# Patient Record
Sex: FEMALE | NOT HISPANIC OR LATINO
[De-identification: names, ages, dates, MRNs, and addresses within clinical notes are randomized per-mention and may not be internally consistent; named-entity substitution may affect disease eponyms.]

---

## 2017-05-10 PROBLEM — Z00.00 ENCOUNTER FOR PREVENTIVE HEALTH EXAMINATION: Status: ACTIVE | Noted: 2017-05-10

## 2017-05-12 ENCOUNTER — APPOINTMENT (OUTPATIENT)
Dept: GASTROENTEROLOGY | Facility: CLINIC | Age: 27
End: 2017-05-12

## 2017-05-12 ENCOUNTER — OUTPATIENT (OUTPATIENT)
Dept: OUTPATIENT SERVICES | Facility: HOSPITAL | Age: 27
LOS: 1 days | Discharge: HOME | End: 2017-05-12

## 2017-05-12 DIAGNOSIS — A04.8 OTHER SPECIFIED BACTERIAL INTESTINAL INFECTIONS: ICD-10-CM

## 2017-05-12 RX ORDER — METRONIDAZOLE 500 MG/1
500 TABLET ORAL TWICE DAILY
Qty: 28 | Refills: 0 | Status: COMPLETED | COMMUNITY
Start: 2017-05-12

## 2017-05-12 RX ORDER — METRONIDAZOLE 500 MG/1
500 TABLET ORAL TWICE DAILY
Qty: 28 | Refills: 0 | Status: ACTIVE | COMMUNITY
Start: 2017-05-12 | End: 1900-01-01

## 2017-05-12 RX ORDER — PANTOPRAZOLE 40 MG/1
40 TABLET, DELAYED RELEASE ORAL TWICE DAILY
Qty: 28 | Refills: 3 | Status: ACTIVE | COMMUNITY
Start: 2017-05-12 | End: 1900-01-01

## 2017-05-12 RX ORDER — CLARITHROMYCIN 500 MG/1
500 TABLET, FILM COATED ORAL
Qty: 28 | Refills: 0 | Status: ACTIVE | COMMUNITY
Start: 2017-05-12 | End: 1900-01-01

## 2017-05-12 RX ORDER — PANTOPRAZOLE 40 MG/1
40 TABLET, DELAYED RELEASE ORAL
Qty: 30 | Refills: 0 | Status: ACTIVE | COMMUNITY
Start: 2017-04-10

## 2017-06-05 ENCOUNTER — APPOINTMENT (OUTPATIENT)
Dept: SURGERY | Facility: CLINIC | Age: 27
End: 2017-06-05

## 2017-06-16 ENCOUNTER — APPOINTMENT (OUTPATIENT)
Dept: GASTROENTEROLOGY | Facility: CLINIC | Age: 27
End: 2017-06-16

## 2017-06-28 DIAGNOSIS — R93.8 ABNORMAL FINDINGS ON DIAGNOSTIC IMAGING OF OTHER SPECIFIED BODY STRUCTURES: ICD-10-CM

## 2017-06-28 DIAGNOSIS — A04.8 OTHER SPECIFIED BACTERIAL INTESTINAL INFECTIONS: ICD-10-CM

## 2018-01-08 ENCOUNTER — INPATIENT (INPATIENT)
Facility: HOSPITAL | Age: 28
LOS: 17 days | Discharge: HOME | DRG: 330 | End: 2018-01-26
Attending: SURGERY | Admitting: SURGERY
Payer: SELF-PAY

## 2018-01-08 VITALS
SYSTOLIC BLOOD PRESSURE: 103 MMHG | RESPIRATION RATE: 16 BRPM | TEMPERATURE: 97 F | OXYGEN SATURATION: 100 % | DIASTOLIC BLOOD PRESSURE: 67 MMHG | HEART RATE: 71 BPM

## 2018-01-08 LAB
ALBUMIN SERPL ELPH-MCNC: 3.8 G/DL — SIGNIFICANT CHANGE UP (ref 3.4–5)
ALP SERPL-CCNC: 75 U/L — SIGNIFICANT CHANGE UP (ref 40–120)
ALT FLD-CCNC: 18 U/L — SIGNIFICANT CHANGE UP (ref 12–42)
ANION GAP SERPL CALC-SCNC: 4 MMOL/L — LOW (ref 9–16)
ANISOCYTOSIS BLD QL: SIGNIFICANT CHANGE UP
APPEARANCE UR: CLEAR — SIGNIFICANT CHANGE UP
APTT BLD: 20.7 SEC — LOW (ref 27.5–36.5)
AST SERPL-CCNC: 25 U/L — SIGNIFICANT CHANGE UP (ref 15–37)
BASOPHILS NFR BLD AUTO: 1.3 % — SIGNIFICANT CHANGE UP (ref 0–2)
BILIRUB SERPL-MCNC: 0.2 MG/DL — SIGNIFICANT CHANGE UP (ref 0.2–1.2)
BILIRUB UR-MCNC: NEGATIVE — SIGNIFICANT CHANGE UP
BUN SERPL-MCNC: 10 MG/DL — SIGNIFICANT CHANGE UP (ref 7–23)
CALCIUM SERPL-MCNC: 9.4 MG/DL — SIGNIFICANT CHANGE UP (ref 8.5–10.5)
CHLORIDE SERPL-SCNC: 105 MMOL/L — SIGNIFICANT CHANGE UP (ref 96–108)
CO2 SERPL-SCNC: 29 MMOL/L — SIGNIFICANT CHANGE UP (ref 22–31)
COLOR SPEC: YELLOW — SIGNIFICANT CHANGE UP
CREAT SERPL-MCNC: 0.7 MG/DL — SIGNIFICANT CHANGE UP (ref 0.5–1.3)
DIFF PNL FLD: NEGATIVE — SIGNIFICANT CHANGE UP
EOSINOPHIL NFR BLD AUTO: 2.1 % — SIGNIFICANT CHANGE UP (ref 0–6)
ETHANOL SERPL-MCNC: <3 MG/DL — SIGNIFICANT CHANGE UP
GLUCOSE SERPL-MCNC: 89 MG/DL — SIGNIFICANT CHANGE UP (ref 70–99)
GLUCOSE UR QL: NEGATIVE — SIGNIFICANT CHANGE UP
HCG SERPL-ACNC: <1 MIU/ML — SIGNIFICANT CHANGE UP
HCG UR QL: NEGATIVE — SIGNIFICANT CHANGE UP
HCT VFR BLD CALC: 29.3 % — LOW (ref 34.5–45)
HGB BLD-MCNC: 8.3 G/DL — LOW (ref 11.5–15.5)
HYPOCHROMIA BLD QL: SIGNIFICANT CHANGE UP
IMM GRANULOCYTES NFR BLD AUTO: 0.3 % — SIGNIFICANT CHANGE UP (ref 0–1.5)
INR BLD: 1.01 — SIGNIFICANT CHANGE UP (ref 0.88–1.16)
KETONES UR-MCNC: NEGATIVE — SIGNIFICANT CHANGE UP
LACTATE SERPL-SCNC: 0.5 MMOL/L — SIGNIFICANT CHANGE UP (ref 0.4–2)
LEUKOCYTE ESTERASE UR-ACNC: NEGATIVE — SIGNIFICANT CHANGE UP
LIDOCAIN IGE QN: 254 U/L — SIGNIFICANT CHANGE UP (ref 73–393)
LYMPHOCYTES # BLD AUTO: 33.4 % — SIGNIFICANT CHANGE UP (ref 13–44)
MCHC RBC-ENTMCNC: 19 PG — LOW (ref 27–34)
MCHC RBC-ENTMCNC: 28.3 G/DL — LOW (ref 32–36)
MCV RBC AUTO: 67.2 FL — LOW (ref 80–100)
MICROCYTES BLD QL: SIGNIFICANT CHANGE UP
MONOCYTES NFR BLD AUTO: 4.1 % — SIGNIFICANT CHANGE UP (ref 2–14)
NEUTROPHILS NFR BLD AUTO: 58.8 % — SIGNIFICANT CHANGE UP (ref 43–77)
NITRITE UR-MCNC: NEGATIVE — SIGNIFICANT CHANGE UP
PH UR: 6 — SIGNIFICANT CHANGE UP (ref 5–8)
PLAT MORPH BLD: NORMAL — SIGNIFICANT CHANGE UP
PLATELET # BLD AUTO: 62 K/UL — LOW (ref 150–400)
POTASSIUM SERPL-MCNC: 3.8 MMOL/L — SIGNIFICANT CHANGE UP (ref 3.5–5.3)
POTASSIUM SERPL-SCNC: 3.8 MMOL/L — SIGNIFICANT CHANGE UP (ref 3.5–5.3)
PROT SERPL-MCNC: 8.4 G/DL — HIGH (ref 6.4–8.2)
PROT UR-MCNC: NEGATIVE MG/DL — SIGNIFICANT CHANGE UP
PROTHROM AB SERPL-ACNC: 11.1 SEC — SIGNIFICANT CHANGE UP (ref 9.8–12.7)
RBC # BLD: 4.36 M/UL — SIGNIFICANT CHANGE UP (ref 3.8–5.2)
RBC # FLD: 22.4 % — HIGH (ref 10.3–16.9)
RBC BLD AUTO: (no result)
SODIUM SERPL-SCNC: 138 MMOL/L — SIGNIFICANT CHANGE UP (ref 132–145)
SP GR SPEC: 1.02 — SIGNIFICANT CHANGE UP (ref 1–1.03)
TSH SERPL-MCNC: 1.26 UIU/ML — SIGNIFICANT CHANGE UP (ref 0.36–3.74)
UROBILINOGEN FLD QL: 0.2 E.U./DL — SIGNIFICANT CHANGE UP
WBC # BLD: 7 K/UL — SIGNIFICANT CHANGE UP (ref 3.8–10.5)
WBC # FLD AUTO: 7 K/UL — SIGNIFICANT CHANGE UP (ref 3.8–10.5)

## 2018-01-08 PROCEDURE — 74177 CT ABD & PELVIS W/CONTRAST: CPT | Mod: 26

## 2018-01-08 PROCEDURE — 76830 TRANSVAGINAL US NON-OB: CPT | Mod: 26

## 2018-01-08 PROCEDURE — 99284 EMERGENCY DEPT VISIT MOD MDM: CPT

## 2018-01-08 RX ORDER — HEPARIN SODIUM 5000 [USP'U]/ML
5000 INJECTION INTRAVENOUS; SUBCUTANEOUS EVERY 12 HOURS
Qty: 0 | Refills: 0 | Status: DISCONTINUED | OUTPATIENT
Start: 2018-01-08 | End: 2018-01-16

## 2018-01-08 RX ORDER — MORPHINE SULFATE 50 MG/1
2 CAPSULE, EXTENDED RELEASE ORAL ONCE
Qty: 0 | Refills: 0 | Status: DISCONTINUED | OUTPATIENT
Start: 2018-01-08 | End: 2018-01-08

## 2018-01-08 RX ORDER — ONDANSETRON 8 MG/1
4 TABLET, FILM COATED ORAL EVERY 6 HOURS
Qty: 0 | Refills: 0 | Status: DISCONTINUED | OUTPATIENT
Start: 2018-01-08 | End: 2018-01-26

## 2018-01-08 RX ORDER — ACETAMINOPHEN 500 MG
1000 TABLET ORAL ONCE
Qty: 0 | Refills: 0 | Status: COMPLETED | OUTPATIENT
Start: 2018-01-08 | End: 2018-01-08

## 2018-01-08 RX ORDER — SODIUM CHLORIDE 9 MG/ML
1000 INJECTION, SOLUTION INTRAVENOUS
Qty: 0 | Refills: 0 | Status: DISCONTINUED | OUTPATIENT
Start: 2018-01-08 | End: 2018-01-11

## 2018-01-08 RX ORDER — SODIUM CHLORIDE 9 MG/ML
1000 INJECTION INTRAMUSCULAR; INTRAVENOUS; SUBCUTANEOUS ONCE
Qty: 0 | Refills: 0 | Status: COMPLETED | OUTPATIENT
Start: 2018-01-08 | End: 2018-01-08

## 2018-01-08 RX ORDER — ONDANSETRON 8 MG/1
4 TABLET, FILM COATED ORAL ONCE
Qty: 0 | Refills: 0 | Status: COMPLETED | OUTPATIENT
Start: 2018-01-08 | End: 2018-01-08

## 2018-01-08 RX ORDER — IOHEXOL 300 MG/ML
50 INJECTION, SOLUTION INTRAVENOUS ONCE
Qty: 0 | Refills: 0 | Status: COMPLETED | OUTPATIENT
Start: 2018-01-08 | End: 2018-01-08

## 2018-01-08 RX ADMIN — SODIUM CHLORIDE 2000 MILLILITER(S): 9 INJECTION INTRAMUSCULAR; INTRAVENOUS; SUBCUTANEOUS at 19:19

## 2018-01-08 RX ADMIN — Medication 400 MILLIGRAM(S): at 23:34

## 2018-01-08 RX ADMIN — IOHEXOL 50 MILLILITER(S): 300 INJECTION, SOLUTION INTRAVENOUS at 14:25

## 2018-01-08 RX ADMIN — MORPHINE SULFATE 2 MILLIGRAM(S): 50 CAPSULE, EXTENDED RELEASE ORAL at 14:24

## 2018-01-08 RX ADMIN — SODIUM CHLORIDE 2000 MILLILITER(S): 9 INJECTION INTRAMUSCULAR; INTRAVENOUS; SUBCUTANEOUS at 14:25

## 2018-01-08 RX ADMIN — ONDANSETRON 4 MILLIGRAM(S): 8 TABLET, FILM COATED ORAL at 14:24

## 2018-01-08 NOTE — ED PROVIDER NOTE - OBJECTIVE STATEMENT
27 female pt, hx of intussusception in the past, required surgery and then has had 2 more surgeries for SBO afterwards. Most of the work up in [place], then [place]. Today presents w several complains:    1. abd pain - started 3 days ago, worsening upper and L sided abdomen, bloated sensation, but able to pass gas. nausea but no vomiting, no fever, no chills    2. vag bleeding - pt w hx of anemia, most likely iron deficiency and has had vag bleeding for 3 weeks. Hx of D/C. Irregular periods.

## 2018-01-08 NOTE — ED ADULT TRIAGE NOTE - CHIEF COMPLAINT QUOTE
Patient complaining of abdominal pain after she eats, and complaining of vaginal bleeding x 2weeks. Denies pregnancy

## 2018-01-08 NOTE — ED PROVIDER NOTE - PROGRESS NOTE DETAILS
US w some chronic non specific changes that pt was aware of. CT w findings suggestive of new intussusception w lead point. Discussed case w Dr Victoria for admission.

## 2018-01-08 NOTE — ED ADULT NURSE REASSESSMENT NOTE - NS ED NURSE REASSESS COMMENT FT1
Pt. remains in room awaiting admission and transfer to [facility]. Pt. refusing pain medications at this time, pt. educated on the availability of medication if she changes her mind. Report given awaiting transfer.

## 2018-01-09 ENCOUNTER — FORM ENCOUNTER (OUTPATIENT)
Age: 28
End: 2018-01-09

## 2018-01-09 DIAGNOSIS — Z98.890 OTHER SPECIFIED POSTPROCEDURAL STATES: Chronic | ICD-10-CM

## 2018-01-09 DIAGNOSIS — N93.9 ABNORMAL UTERINE AND VAGINAL BLEEDING, UNSPECIFIED: ICD-10-CM

## 2018-01-09 DIAGNOSIS — K56.50 INTESTINAL ADHESIONS [BANDS], UNSPECIFIED AS TO PARTIAL VERSUS COMPLETE OBSTRUCTION: Chronic | ICD-10-CM

## 2018-01-09 LAB
ACANTHOCYTES BLD QL SMEAR: SLIGHT — SIGNIFICANT CHANGE UP
ANION GAP SERPL CALC-SCNC: 10 MMOL/L — SIGNIFICANT CHANGE UP (ref 5–17)
ANISOCYTOSIS BLD QL: SIGNIFICANT CHANGE UP
BILIRUB DIRECT SERPL-MCNC: <0.2 MG/DL — SIGNIFICANT CHANGE UP (ref 0–0.2)
BUN SERPL-MCNC: 6 MG/DL — LOW (ref 7–23)
BURR CELLS BLD QL SMEAR: SIGNIFICANT CHANGE UP
BURR CELLS BLD QL SMEAR: SIGNIFICANT CHANGE UP
CALCIUM SERPL-MCNC: 8.4 MG/DL — SIGNIFICANT CHANGE UP (ref 8.4–10.5)
CHLORIDE SERPL-SCNC: 105 MMOL/L — SIGNIFICANT CHANGE UP (ref 96–108)
CLOSURE TME COLL+EPINEP BLD: 43 K/UL — LOW (ref 150–400)
CO2 SERPL-SCNC: 22 MMOL/L — SIGNIFICANT CHANGE UP (ref 22–31)
CREAT SERPL-MCNC: 0.65 MG/DL — SIGNIFICANT CHANGE UP (ref 0.5–1.3)
D DIMER BLD IA.RAPID-MCNC: <150 NG/ML DDU — SIGNIFICANT CHANGE UP
DACRYOCYTES BLD QL SMEAR: SLIGHT — SIGNIFICANT CHANGE UP
ELLIPTOCYTES BLD QL SMEAR: SLIGHT — SIGNIFICANT CHANGE UP
FERRITIN SERPL-MCNC: 11.7 NG/ML — LOW (ref 15–150)
GIANT PLATELETS BLD QL SMEAR: PRESENT — SIGNIFICANT CHANGE UP
GLUCOSE SERPL-MCNC: 77 MG/DL — SIGNIFICANT CHANGE UP (ref 70–99)
HAPTOGLOB SERPL-MCNC: 129 MG/DL — SIGNIFICANT CHANGE UP (ref 34–200)
HBV SURFACE AG SER-ACNC: SIGNIFICANT CHANGE UP
HCT VFR BLD CALC: 23.5 % — LOW (ref 34.5–45)
HCT VFR BLD CALC: 26.3 % — LOW (ref 34.5–45)
HCT VFR BLD CALC: 28.6 % — LOW (ref 34.5–45)
HCV AB S/CO SERPL IA: 0.17 S/CO — SIGNIFICANT CHANGE UP
HCV AB SERPL-IMP: SIGNIFICANT CHANGE UP
HGB BLD-MCNC: 6.8 G/DL — CRITICAL LOW (ref 11.5–15.5)
HGB BLD-MCNC: 7.4 G/DL — LOW (ref 11.5–15.5)
HGB BLD-MCNC: 7.8 G/DL — LOW (ref 11.5–15.5)
HIV 1+2 AB+HIV1 P24 AG SERPL QL IA: SIGNIFICANT CHANGE UP
HYPOCHROMIA BLD QL: SIGNIFICANT CHANGE UP
IRON SATN MFR SERPL: 13 % — LOW (ref 14–50)
IRON SATN MFR SERPL: 55 UG/DL — SIGNIFICANT CHANGE UP (ref 30–160)
LDH SERPL L TO P-CCNC: 134 U/L — SIGNIFICANT CHANGE UP (ref 50–242)
LG PLATELETS BLD QL AUTO: PRESENT — SIGNIFICANT CHANGE UP
MAGNESIUM SERPL-MCNC: 1.8 MG/DL — SIGNIFICANT CHANGE UP (ref 1.6–2.6)
MANUAL DIF COMMENT BLD-IMP: SIGNIFICANT CHANGE UP
MANUAL SMEAR VERIFICATION: SIGNIFICANT CHANGE UP
MCHC RBC-ENTMCNC: 18.4 PG — LOW (ref 27–34)
MCHC RBC-ENTMCNC: 18.8 PG — LOW (ref 27–34)
MCHC RBC-ENTMCNC: 19.3 PG — LOW (ref 27–34)
MCHC RBC-ENTMCNC: 27.3 G/DL — LOW (ref 32–36)
MCHC RBC-ENTMCNC: 28.1 G/DL — LOW (ref 32–36)
MCHC RBC-ENTMCNC: 28.9 G/DL — LOW (ref 32–36)
MCV RBC AUTO: 66.8 FL — LOW (ref 80–100)
MCV RBC AUTO: 66.8 FL — LOW (ref 80–100)
MCV RBC AUTO: 67.5 FL — LOW (ref 80–100)
MICROCYTES BLD QL: SIGNIFICANT CHANGE UP
OVALOCYTES BLD QL SMEAR: SLIGHT — SIGNIFICANT CHANGE UP
PHOSPHATE SERPL-MCNC: 3.3 MG/DL — SIGNIFICANT CHANGE UP (ref 2.5–4.5)
PLAT MORPH BLD: (no result)
PLATELET # BLD AUTO: 100 K/UL — LOW (ref 150–400)
PLATELET # BLD AUTO: 63 K/UL — LOW (ref 150–400)
PLATELET # BLD AUTO: 67 K/UL — LOW (ref 150–400)
POIKILOCYTOSIS BLD QL AUTO: SIGNIFICANT CHANGE UP
POLYCHROMASIA BLD QL SMEAR: SLIGHT — SIGNIFICANT CHANGE UP
POTASSIUM SERPL-MCNC: 3.9 MMOL/L — SIGNIFICANT CHANGE UP (ref 3.5–5.3)
POTASSIUM SERPL-SCNC: 3.9 MMOL/L — SIGNIFICANT CHANGE UP (ref 3.5–5.3)
RBC # BLD: 3.52 M/UL — LOW (ref 3.8–5.2)
RBC # BLD: 3.94 M/UL — SIGNIFICANT CHANGE UP (ref 3.8–5.2)
RBC # BLD: 4.24 M/UL — SIGNIFICANT CHANGE UP (ref 3.8–5.2)
RBC # BLD: 4.24 M/UL — SIGNIFICANT CHANGE UP (ref 3.8–5.2)
RBC # FLD: 21.7 % — HIGH (ref 10.3–16.9)
RBC # FLD: 21.8 % — HIGH (ref 10.3–16.9)
RBC # FLD: 22.2 % — HIGH (ref 10.3–16.9)
RBC BLD AUTO: (no result)
RETICS/RBC NFR: 1.2 % — SIGNIFICANT CHANGE UP (ref 0.5–2.5)
SCHISTOCYTES BLD QL AUTO: SIGNIFICANT CHANGE UP
SODIUM SERPL-SCNC: 137 MMOL/L — SIGNIFICANT CHANGE UP (ref 135–145)
SPHEROCYTES BLD QL SMEAR: SLIGHT — SIGNIFICANT CHANGE UP
TIBC SERPL-MCNC: 411 UG/DL — SIGNIFICANT CHANGE UP (ref 220–430)
UIBC SERPL-MCNC: 356 UG/DL — SIGNIFICANT CHANGE UP (ref 110–370)
WBC # BLD: 4.9 K/UL — SIGNIFICANT CHANGE UP (ref 3.8–10.5)
WBC # BLD: 6.2 K/UL — SIGNIFICANT CHANGE UP (ref 3.8–10.5)
WBC # BLD: 6.6 K/UL — SIGNIFICANT CHANGE UP (ref 3.8–10.5)
WBC # FLD AUTO: 4.9 K/UL — SIGNIFICANT CHANGE UP (ref 3.8–10.5)
WBC # FLD AUTO: 6.2 K/UL — SIGNIFICANT CHANGE UP (ref 3.8–10.5)
WBC # FLD AUTO: 6.6 K/UL — SIGNIFICANT CHANGE UP (ref 3.8–10.5)

## 2018-01-09 PROCEDURE — 99222 1ST HOSP IP/OBS MODERATE 55: CPT | Mod: GC

## 2018-01-09 RX ORDER — SOD SULF/SODIUM/NAHCO3/KCL/PEG
4000 SOLUTION, RECONSTITUTED, ORAL ORAL ONCE
Qty: 0 | Refills: 0 | Status: COMPLETED | OUTPATIENT
Start: 2018-01-09 | End: 2018-01-09

## 2018-01-09 RX ORDER — HYDROMORPHONE HYDROCHLORIDE 2 MG/ML
0.5 INJECTION INTRAMUSCULAR; INTRAVENOUS; SUBCUTANEOUS EVERY 6 HOURS
Qty: 0 | Refills: 0 | Status: DISCONTINUED | OUTPATIENT
Start: 2018-01-09 | End: 2018-01-09

## 2018-01-09 RX ORDER — ACETAMINOPHEN 500 MG
700 TABLET ORAL ONCE
Qty: 0 | Refills: 0 | Status: DISCONTINUED | OUTPATIENT
Start: 2018-01-09 | End: 2018-01-12

## 2018-01-09 RX ADMIN — HEPARIN SODIUM 5000 UNIT(S): 5000 INJECTION INTRAVENOUS; SUBCUTANEOUS at 18:03

## 2018-01-09 RX ADMIN — Medication 4000 MILLILITER(S): at 18:03

## 2018-01-09 RX ADMIN — HEPARIN SODIUM 5000 UNIT(S): 5000 INJECTION INTRAVENOUS; SUBCUTANEOUS at 05:53

## 2018-01-09 NOTE — CONSULT NOTE ADULT - SUBJECTIVE AND OBJECTIVE BOX
HPI:  28 YO F h/o intussusception (2008) s/p ex-lap with SBR and appendectomy c/b small bowel obstruction s/p diagnostic laparoscopic with lysis of adhesion in 2016 p/w abdominal pain x 5 days. Abdominal pain is R-sided, worsened with food intake, and associated with generalized weakness and palpitations. Pt denies fever, chills, CP, SOB, melena, hematochezia. Pt states that she previously had a heavy menstrual period that improved with birth control, however, she has not been on birth control for the last year and has been having intermittent vaginal spotting. Over the last 2 weeks, she has been having vaginal bleeding with clots 1-2 episodes per day. Pt had a BM yesterday described as the consistency of shaving cream and is has been passing flatus.      Of note, she last had an EGD and colonoscopy at [facility] which showed numerous polyps that were removed. Per patient pathology was benign.     Allergies  No Known Allergies    Home Medications: None    MEDICATIONS:  MEDICATIONS  (STANDING):  acetaminophen  IVPB. 700 milliGRAM(s) IV Intermittent once  heparin  Injectable 5000 Unit(s) SubCutaneous every 12 hours  lactated ringers. 1000 milliLiter(s) (100 mL/Hr) IV Continuous <Continuous>    MEDICATIONS  (PRN):  ondansetron Injectable 4 milliGRAM(s) IV Push every 6 hours PRN Nausea    PAST MEDICAL & SURGICAL HISTORY:  Intestinal adhesions with obstruction, unspecified whether partial or complete: diagnostic lap with SANDRO  H/O exploratory laparotomy: with SBR and appendectomy    FAMILY HISTORY:  Mom: Oral cancer  Denies significant family history of CRC, gastric cancer, ovarian Ca, endometrial Ca.     SOCIAL HISTORY:  [place]: Denies  Alcohol: Social  Illicit Drugs: Denies    REVIEW OF SYSTEMS: per HPI    Vital Signs Last 24 Hrs  T(C): 37 (09 Jan 2018 12:14), Max: 37 (09 Jan 2018 12:14)  T(F): 98.6 (09 Jan 2018 12:14), Max: 98.6 (09 Jan 2018 12:14)  HR: 69 (09 Jan 2018 12:14) (60 - 82)  BP: 89/56 (09 Jan 2018 12:14) (89/56 - 106/65)  BP(mean): --  RR: 16 (09 Jan 2018 12:14) (16 - 17)  SpO2: 98% (09 Jan 2018 12:14) (98% - 100%)    01-08 @ 07:01  -  01-09 @ 07:00  --------------------------------------------------------  IN: 900 mL / OUT: 0 mL / NET: 900 mL    01-09 @ 07:01 - 01-09 @ 13:26  --------------------------------------------------------  IN: 500 mL / OUT: 0 mL / NET: 500 mL    PHYSICAL EXAM:    General: Frail; in no acute distress  HEENT: MMM, conjunctiva and sclera clear  Gastrointestinal: Soft, RLQ TTP non-distended; No rebound or guarding  Extremities: Normal range of motion, No clubbing, cyanosis or edema  Neurological: Alert and oriented x3  Skin: Warm and dry. No obvious rash    LABS:                        7.4    6.2   )-----------( 67       ( 09 Jan 2018 12:42 )             26.3     01-09    137  |  105  |  6<L>  ----------------------------<  77  3.9   |  22  |  0.65    Ca    8.4      09 Jan 2018 06:38  Phos  3.3     01-09  Mg     1.8     01-09    TPro  8.4<H>  /  Alb  3.8  /  TBili  0.2  /  DBili  x   /  AST  25  /  ALT  18  /  AlkPhos  75  01-08    RADIOLOGY & ADDITIONAL STUDIES:  CT Abdomen and Pelvis w/ Oral Cont and w/ IV Cont (01.08.18 @ 16:48)  FINDINGS:    Lower chest: Clear lung bases. No pericardial effusion. No cardiomegaly.    Liver: Smooth in contour. No focal mass. Portal and hepatic veins are   patent.    Biliary system: Gallbladder is normal in size. No calcified gallstones.   No pericholecystic fluid or gallbladder wall thickening. No biliary   ductal dilatation.    Pancreas: There is a 0.7 cm cystic lesion within the body of the   pancreas.no pancreatic ductal dilatation. The main pancreatic duct drains   via the minor and major papillae.    Spleen: Unremarkable.    Adrenal glands: Unremarkable.    Kidneys: Symmetric parenchymal enhancement. No renal mass. No   hydronephrosis. No renal or ureteralstone.     Urinary Bladder: The bladder is collapsed, otherwise normal in appearance.    Reproductive organs: Evaluation of the uterus demonstrates uterus to be   anteverted with endometrial thickening measuring up to 2.1 cm, which may   represent endometrial hyperplasia or   hematometria.A few cervical   nabothian cysts are again noted, the largest measuring 1.5 cm. The   ovaries appear to be unremarkable. The right ovary measures 1.3 x 2.6 x   2.5 cm. The left ovary measures 3.4 x 1.7 x 2.9 cm.The urinary bladder   is underdistended.    Bowel/Peritoneum: Left lower quadrant enteroenteric intussusception.   There is a 2.3 x 1.8 x 2.1 cm polypoid lesion which projects at the   distal aspect of the intussusception, and likely represents a lead point.   There is no evidence of pneumatosis or free air. No portal venous gas is   identified. The appendix is not visualized on this examination, however   there are no secondary signs of acute appendicitis. No ascites. Dilated   focal loop of ileum in the right lower quadrant appears to be due to    resection with side-to-side anastomosis. 3.1 x 2.9 x 3.1 cm  mass in   relation to the inferior pole of cecum. Suspicion of subtle circular fold   thickening involving the jejunum. The ingested oral contrast material has   transited to the proximal ascending colon thus no surgical obstruction.    Lymph nodes: No lymphadenopathy.    Aorta/IVC: Normal caliber.    Abdominal wall: No hernia.    Bones/Soft tissues: No acute abnormality.  Bilateral spondylolysis of L5   with grade 1 anterolisthesis of L5 on S1.      IMPRESSION:   1.  Small bowel-small bowel (enteroenteric) intussusception in the left   lower quadrant. 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 Differential diagnosis includes adenomatous   polyp, Meckel's diverticulum, leiomyoma, neurofibroma, and hamartomatous   polyp. No surgical obstruction. Ingested  oral contrast material seen in   the proximal ascending colon.   2.  Mass in relation to the inferior pole of cecum. Differential   diagnosis would include cecal polyp, inverted appendix, ileocecal   intussusception, desmoid. A normal appendix is not identified. Status   post prior small bowel resection.    The appearance of the endometrial canal thickening could be related to   endometrial hyperplasia, giant endometrial polyp.

## 2018-01-09 NOTE — PROGRESS NOTE ADULT - SUBJECTIVE AND OBJECTIVE BOX
ON (1/8) : New admission from [facility] for intussusception with no sign of SBO. Afebrile, benign abd exam with no leukocytosis. VSS. ON () : New admission from [facility] for intussusception with no sign of SBO. Afebrile, benign abd exam with no leukocytosis. VSS.         SUBJECTIVE: complaints of RLQ pain  Flatus: [x ] YES [ ] NO             Bowel Movement: [x ] YES [ ] NO  Pain (0-10):            Pain Control Adequate: [x ] YES [ ] NO  Nausea: [ ] YES [x ] NO            Vomiting: [ ] YES [x ] NO  Diarrhea: [ ] YES [x ] NO         Constipation: [ ] YES [x ] NO     Chest Pain: [ ] YES [x ] NO    SOB:  [ ] YES [x ] NO    MEDICATIONS  (STANDING):  acetaminophen  IVPB. 700 milliGRAM(s) IV Intermittent once  heparin  Injectable 5000 Unit(s) SubCutaneous every 12 hours  lactated ringers. 1000 milliLiter(s) (100 mL/Hr) IV Continuous <Continuous>    MEDICATIONS  (PRN):  ondansetron Injectable 4 milliGRAM(s) IV Push every 6 hours PRN Nausea      Vital Signs Last 24 Hrs  T(C): 36.1 (2018 05:50), Max: 36.9 (2018 17:29)  T(F): 97 (2018 05:50), Max: 98.5 (2018 17:29)  HR: 74 (2018 05:50) (60 - 82)  BP: 99/62 (2018 05:50) (99/60 - 104/56)  BP(mean): --  RR: 16 (2018 05:50) (16 - 17)  SpO2: 99% (2018 05:50) (99% - 100%)    PHYSICAL EXAM:      Constitutional: A&Ox3      Respiratory: non labored breathing, no respiratory distress    Cardiovascular: NSR, RRR    Gastrointestinal: Soft ND, TTP in RLQ, no rebound or guarding                Genitourinary: voiding    Extremities: (-) edema                  I&O's Detail    2018 07:01  -  2018 07:00  --------------------------------------------------------  IN:    lactated ringers.: 900 mL  Total IN: 900 mL    OUT:  Total OUT: 0 mL    Total NET: 900 mL          LABS:                        8.3    7.0   )-----------( 62       ( 2018 14:21 )             29.3         137  |  105  |  6<L>  ----------------------------<  77  3.9   |  22  |  0.65    Ca    8.4      2018 06:38  Phos  3.3       Mg     1.8         TPro  8.4<H>  /  Alb  3.8  /  TBili  0.2  /  DBili  x   /  AST  25  /  ALT  18  /  AlkPhos  75      PT/INR - ( 2018 14:21 )   PT: 11.1 sec;   INR: 1.01          PTT - ( 2018 14:21 )  PTT:20.7 sec  Urinalysis Basic - ( 2018 14:14 )    Color: Yellow / Appearance: Clear / S.025 / pH: x  Gluc: x / Ketone: NEGATIVE  / Bili: NEGATIVE / Urobili: 0.2 E.U./dL   Blood: x / Protein: NEGATIVE mg/dL / Nitrite: NEGATIVE   Leuk Esterase: NEGATIVE / RBC: x / WBC x   Sq Epi: x / Non Sq Epi: x / Bacteria: x        RADIOLOGY & ADDITIONAL STUDIES:

## 2018-01-09 NOTE — CONSULT NOTE ADULT - SUBJECTIVE AND OBJECTIVE BOX
Hematology Oncology Consult Note (Dr. Lee)     This is a 28 yo female with no significant past medical history, surgical history of intussusception on , in which she underwent ex-lap with SBR and appendectomy in outside hospital ([place]) and complicated by small bowel obstruction s/p diagnostic laparoscopic with lysis of adhesion in . Since then, patient was asymptomatic until last year , she developed pain over the epigastric area and went to [facility] where she had colonoscopy and endoscopy done. She was informed to have polyps. Biopsy was taken and was told that it was a benign polyp.     She presented to [facility] yesterday () for abdominal pain since Friday () which related to food intake. The pain is acute in onset, located at epigastric area in the beginning and shifted to RLQ. There was no nausea or vomiting and no fever. Patient went to [facility] and CT scan revealed a left lower quadrant enteroenteric intussusception and  2.3 x 1.8 x 2.1 cm polypoid lesion (small bowel tumor) which projects at the distal aspect of the intussusception, and likely represents a lead point. There is also a  3.1 x 2.9 x 3.1 cm  mass in relation to the inferior pole of cecum. No pneumoperitoneum and no sign of obstruction.    Patient last bowel movement was yesterday and last meal was breakfast this morning. No bloody bowel movement, no diarrhea and no constipation. Passing gas regularly.  Previous colonoscopy was 2017 as mentioned.       ROS is otherwise negative.      PAST MEDICAL & SURGICAL HISTORY:  Intestinal adhesions with obstruction, unspecified whether partial or complete: diagnostic lap with SANDRO  H/O exploratory laparotomy: with SBR and appendectomy      Allergies: No known allergies       Medications:  heparin  Injectable 5000 Unit(s) SubCutaneous every 12 hours        Social History: No hx of smoking  	Social drinker  No hx of drug abused    FAMILY HISTORY: pending      PHYSICAL EXAM:    T(F): 97.1 (18 @ 09:49), Max: 98.5 (18 @ 17:29)  HR: 72 (18 @ 09:49) (60 - 82)  BP: 95/54 (18 @ 09:49) (95/54 - 106/65)  RR: 16 (18 @ 09:49) (16 - 17)  SpO2: 99% (18 @ 09:49) (99% - 100%)  Wt(kg): --    Daily Height in cm: 149.86 (2018 22:04)    Daily     **Pending        Labs:                          6.8    4.9   )-----------( 63       ( 2018 06:38 )             23.5     CBC Full  -  ( 2018 06:38 )  WBC Count : 4.9 K/uL  Hemoglobin : 6.8 g/dL  Hematocrit : 23.5 %  Platelet Count - Automated : 63 K/uL  Mean Cell Volume : 66.8 fL  Mean Cell Hemoglobin : 19.3 pg  Mean Cell Hemoglobin Concentration : 28.9 g/dL  Auto Neutrophil # : x  Auto Lymphocyte # : x  Auto Monocyte # : x  Auto Eosinophil # : x  Auto Basophil # : x  Auto Neutrophil % : x  Auto Lymphocyte % : x  Auto Monocyte % : x  Auto Eosinophil % : x  Auto Basophil % : x    PT/INR - ( 2018 14:21 )   PT: 11.1 sec;   INR: 1.01          PTT - ( 2018 14:21 )  PTT:20.7 sec        137  |  105  |  6<L>  ----------------------------<  77  3.9   |  22  |  0.65    Ca    8.4      2018 06:38  Phos  3.3       Mg     1.8         TPro  8.4<H>  /  Alb  3.8  /  TBili  0.2  /  DBili  x   /  AST  25  /  ALT  18  /  AlkPhos  75        Urinalysis Basic - ( 2018 14:14 )    Color: Yellow / Appearance: Clear / S.025 / pH: x  Gluc: x / Ketone: NEGATIVE  / Bili: NEGATIVE / Urobili: 0.2 E.U./dL   Blood: x / Protein: NEGATIVE mg/dL / Nitrite: NEGATIVE   Leuk Esterase: NEGATIVE / RBC: x / WBC x   Sq Epi: x / Non Sq Epi: x / Bacteria: x      Ultrasound TV - IMPRESSION:   1.  Diffusely thickened endometrium, most suggestive of endometrial   hyperplasia.    2.  Normal sized bilateral ovaries with innumerable echogenic foci, which   is a nonspecific finding.    3.  Physiologic amount of free fluid in the cul-de-sac.       RECOMMENDATION:  A follow-up transvaginal pelvic ultrasound exam is recommended in one   year, to reevaluate both ovaries.      CT Abd/Pelvis - IMPRESSION:   1.  Small bowel-small bowel (enteroenteric) intussusception in the left   lower quadrant. 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 Differential diagnosis includes adenomatous   polyp, Meckel's diverticulum, leiomyoma, neurofibroma, and hamartomatous   polyp. No surgical obstruction. Ingested  oral contrast material seen in   the proximal ascending colon.   2.  Mass in relation to the inferior pole of cecum. Differential   diagnosis would include cecal polyp, inverted appendix, ileocecal   intussusception, desmoid. A normal appendix is not identified. Status   post prior small bowel resection.  *** ADDENDUM 2018  ***    The appearance of the endometrial canal thickening could be related to   endometrial hyperplasia, giant endometrial polyp. Hematology Oncology Consult Note (Dr. Lee)     This is a 28 yo female with surgical history of intussusception on , in which she underwent ex-lap with SBR and appendectomy in outside hospital ([place]) and complicated by small bowel obstruction s/p diagnostic laparoscopic with lysis of adhesion in . Since then, patient was asymptomatic until last year , she developed pain over the epigastric area and went to [facility] where she had colonoscopy and endoscopy done. She was informed to have polyps. Biopsy was taken and was told that it was a benign polyp.     She presented to [facility] yesterday () for abdominal pain since Friday () which related to food intake. The pain is acute in onset, located at epigastric area in the beginning and shifted to RLQ. There was no nausea or vomiting and no fever. Patient went to [facility] and CT scan revealed a left lower quadrant enteroenteric intussusception and  2.3 x 1.8 x 2.1 cm polypoid lesion (small bowel tumor) which projects at the distal aspect of the intussusception, and likely represents a lead point. There is also a  3.1 x 2.9 x 3.1 cm  mass in relation to the inferior pole of cecum. No pneumoperitoneum and no sign of obstruction.    Patient last bowel movement was yesterday and last meal was breakfast this morning. No bloody bowel movement, no diarrhea and no constipation. Passing gas regularly.  Previous colonoscopy was 2017 as mentioned.     Patient also states that she has been diagnosed with sickle cell trait in the past, and was also diagnosed w/ iron deficiency anemia. Patient also admits to hemorrhoids for which occasionally has BRBPR on toliet paper. Patient also admits to irregular periods sometimes w/ clots and is currently on her period, patient denies heavy menstruation. Unclear Hg baseline per patient, she does not remember her PMD at [facility].        ROS is otherwise negative.      PAST MEDICAL & SURGICAL HISTORY:  Intestinal adhesions with obstruction, unspecified whether partial or complete: diagnostic lap with SANDRO  H/O exploratory laparotomy: with SBR and appendectomy      Allergies: No known allergies       Medications:  heparin  Injectable 5000 Unit(s) SubCutaneous every 12 hours        Social History: No hx of smoking  	Social drinker  No hx of drug abused    FAMILY HISTORY: Mother w/ HIV and oral cancer. No family hx of lymphomas, leukemias or myelodysplasia.       PHYSICAL EXAM:    T(F): 97.1 (18 @ 09:49), Max: 98.5 (18 @ 17:29)  HR: 72 (18 @ 09:49) (60 - 82)  BP: 95/54 (18 @ 09:49) (95/54 - 106/65)  RR: 16 (18 @ 09:49) (16 - 17)  SpO2: 99% (18 @ 09:49) (99% - 100%)  Wt(kg): --    Daily Height in cm: 149.86 (2018 22:04)    Daily     GEN: pleasant, NAD  HEENT: AT/NC, anicteric sclera, no oral lesions  NECK: no jvd, no lymphadenopathy   CVS: +S1S2 RRR no mrg  LUNG: CTA BL  ABD: tenderness to palpation of epigastric and R upper and lower quadrants, +BS, no hepatosplenomegaly palpated  : no cva tenderness  EXT: no c/c/e  NEURO: aaox3, non focal    Labs:                          6.8    4.9   )-----------( 63       ( 2018 06:38 )             23.5     CBC Full  -  ( 2018 06:38 )  WBC Count : 4.9 K/uL  Hemoglobin : 6.8 g/dL  Hematocrit : 23.5   Platelet Count - Automated : 63 K/uL  Mean Cell Volume : 66.8 fL  Mean Cell Hemoglobin : 19.3 pg  Mean Cell Hemoglobin Concentration : 28.9 g/dL  Auto Neutrophil # : x  Auto Lymphocyte # : x  Auto Monocyte # : x  Auto Eosinophil # : x  Auto Basophil # : x  Auto Neutrophil % : x  Auto Lymphocyte % : x  Auto Monocyte % : x  Auto Eosinophil % : x  Auto Basophil % : x    PT/INR - ( 2018 14:21 )   PT: 11.1 sec;   INR: 1.01          PTT - ( 2018 14:21 )  PTT:20.7 sec        137  |  105  |  6<L>  ----------------------------<  77  3.9   |  22  |  0.65    Ca    8.4      2018 06:38  Phos  3.3       Mg     1.8         TPro  8.4<H>  /  Alb  3.8  /  TBili  0.2  /  DBili  x   /  AST  25  /  ALT  18  /  AlkPhos  75        Urinalysis Basic - ( 2018 14:14 )    Color: Yellow / Appearance: Clear / S.025 / pH: x  Gluc: x / Ketone: NEGATIVE  / Bili: NEGATIVE / Urobili: 0.2 E.U./dL   Blood: x / Protein: NEGATIVE mg/dL / Nitrite: NEGATIVE   Leuk Esterase: NEGATIVE / RBC: x / WBC x   Sq Epi: x / Non Sq Epi: x / Bacteria: x      Ultrasound TV - IMPRESSION:   1.  Diffusely thickened endometrium, most suggestive of endometrial   hyperplasia.    2.  Normal sized bilateral ovaries with innumerable echogenic foci, which   is a nonspecific finding.    3.  Physiologic amount of free fluid in the cul-de-sac.       RECOMMENDATION:  A follow-up transvaginal pelvic ultrasound exam is recommended in one   year, to reevaluate both ovaries.      CT Abd/Pelvis - IMPRESSION:   1.  Small bowel-small bowel (enteroenteric) intussusception in the left   lower quadrant. 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 Differential diagnosis includes adenomatous   polyp, Meckel's diverticulum, leiomyoma, neurofibroma, and hamartomatous   polyp. No surgical obstruction. Ingested  oral contrast material seen in   the proximal ascending colon.   2.  Mass in relation to the inferior pole of cecum. Differential   diagnosis would include cecal polyp, inverted appendix, ileocecal   intussusception, desmoid. A normal appendix is not identified. Status   post prior small bowel resection.  *** ADDENDUM 2018  ***    The appearance of the endometrial canal thickening could be related to   endometrial hyperplasia, giant endometrial polyp.

## 2018-01-09 NOTE — PRE-OP CHECKLIST - SELECT TESTS ORDERED
BMP/Urinalysis/CBC/Type and Screen Type and Screen/HCG/BMP/PT/PTT/CBC/INR/Urinalysis HCG/BMP/Urinalysis/CBC/PT/PTT/INR/Type and Screen/EKG

## 2018-01-09 NOTE — H&P ADULT - NSHPPHYSICALEXAM_GEN_ALL_CORE
General: NAD, resting comfortably in bed  C/V: NSR  Pulm: Nonlabored breathing, no respiratory distress  Abd:   Inspection : Midline scar well healed suggestive of previous ex-lap and 4 1-2cm scars at LUQ and LLQ suggestive of previous diagnostic lap scars - well healed. Abdomen is mildly distended. No mass/hernia seen,  Palpation : Mild tenderness over the RLQ. No guarding, no rebound tenderness.  Percussion : tympanic  Auscultation : bowel sounds present.    Extrem: WWP, no edema, SCDs in place    Vital Signs Last 24 Hrs  T(C): 36.7 (08 Jan 2018 22:04), Max: 36.9 (08 Jan 2018 17:29)  T(F): 98 (08 Jan 2018 22:04), Max: 98.5 (08 Jan 2018 17:29)  HR: 60 (08 Jan 2018 22:04) (60 - 82)  BP: 99/60 (08 Jan 2018 22:04) (99/60 - 104/56)  BP(mean): --  RR: 16 (08 Jan 2018 22:04) (16 - 17)  SpO2: 100% (08 Jan 2018 22:04) (100% - 100%)  I&O's Detail

## 2018-01-09 NOTE — CONSULT NOTE ADULT - ASSESSMENT
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    #Microcytic Anemia  Patient w/ microcytosis upon admission, initial Hg of 8.3 could represent concentrated sample as pt received 2L NS bolus as well as LR. If hx of sickle cell trait and additional anemia her baseline Hg may be lower. In addition, patient currently on menses w/ blood loss. Patient denies any other gross signs of bleeding currently. Clinically non jaundiced appearing. On smear, increased anisocytosis and poikilocytosis including tear drop and elliptocytes, cells also hypochromic w/ increased central pallor consistent with anemia and possible hemoglobinopathy.     -Collateral info from PMD regarding Hg baseline  -trend CBC   -haptoglobin, LDH  -B12, Folate  -Iron panel including TIBC, Ferritin  -reticulocytes   -direct karla     #Thrombocytopenia    Unclear baseline plt count however clinically stable, no ecchymosis. Plts of 62 upon presentation unlikely medication related given timing. CT scan negative for splenomegaly or hepatomegaly, less likely consumption however if signs of clot clinically check for VTEs. On peripheral smear, large platelets seens, roughly 4-6per hpf. Occasional schistocytes.     -Hepatitis Panel, including hepatitis B, C   -stool h.pylori   -HIV testing   -check for pseudothrombocytopenia w/ blue top tube   -d-dimer (consumptive process)    #Small bowel/cecal mass   No lymphadenopathy on CT, awaiting tissue dx    -surgical follow up  -consider tumor markers CEA,   -LDH testing    To be discussed w/ attending 26 y/o female history of sickle cell trait?, iron deficiency anemia, hemorrhoids, intussusception in 2008  s/p ex-lap w/ SBR, appendectomy complicated by SBO s/p lysis of adhesion 2016, persistent symptoms s/p c-scope/egd at [facility] found to have benign polyps, now w/ CT scan c/w LLQ enteroenteric intussusception, 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    #Microcytic Anemia  Patient w/ microcytosis upon admission, initial Hg of 8.3 could represent concentrated sample as pt received 2L NS bolus as well as LR. If hx of sickle cell trait and additional anemia her baseline Hg may be lower. In addition, patient currently on menses w/ blood loss. Patient denies any other gross signs of bleeding currently. Clinically non jaundiced appearing. On smear, increased anisocytosis and poikilocytosis including tear drop and elliptocytes, cells also hypochromic w/ increased central pallor consistent with anemia and possible hemoglobinopathy.     -Collateral info from PMD regarding Hg baseline  -Hg electrophoresis  -trend CBC   -haptoglobin, LDH  -B12, Folate  -Iron panel including TIBC, Ferritin  -reticulocytes   -direct karla     #Thrombocytopenia    Unclear baseline plt count however clinically stable, no ecchymosis. Plts of 62 upon presentation unlikely medication related given timing. CT scan negative for splenomegaly or hepatomegaly, less likely consumption however if signs of clot clinically check for VTEs. On peripheral smear, large platelets seens, roughly 4-6per hpf. Occasional schistocytes.     -Hepatitis Panel, including hepatitis B, C   -stool h.pylori   -HIV testing   -check for pseudothrombocytopenia w/ blue top tube   -d-dimer (consumptive process)    #Small bowel/cecal mass   No lymphadenopathy on CT, awaiting tissue dx    -surgical follow up  -consider tumor markers CEA,   -LDH testing    To be discussed w/ attending 28 y/o female history of sickle cell trait?, iron deficiency anemia, hemorrhoids, intussusception in 2008  s/p ex-lap w/ SBR, appendectomy complicated by SBO s/p lysis of adhesion 2016, persistent symptoms s/p c-scope/egd at [facility] found to have benign polyps, now w/ CT scan c/w LLQ enteroenteric intussusception, 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    #Microcytic Anemia  Patient w/ microcytosis upon admission, initial Hg of 8.3 could represent concentrated sample as pt received 2L NS bolus as well as LR. If hx of sickle cell trait and additional anemia her baseline Hg may be lower. In addition, patient currently on menses w/ blood loss. Patient denies any other gross signs of bleeding currently. Clinically non jaundiced appearing. On smear, increased anisocytosis and poikilocytosis including tear drop and elliptocytes, cells also hypochromic w/ increased central pallor consistent with anemia and possible hemoglobinopathy.     -Collateral info from PMD regarding Hg baseline  -Hg electrophoresis  -trend CBC   -haptoglobin, LDH  -B12, Folate  -Iron panel including TIBC, Ferritin  -reticulocytes   -direct karla   -sedimentation rate, SUDHA     #Thrombocytopenia    Unclear baseline plt count however clinically stable, no ecchymosis. Plts of 62 upon presentation unlikely medication related given timing. CT scan negative for splenomegaly or hepatomegaly, less likely consumption however if signs of clot clinically check for VTEs. On peripheral smear, large platelets seens, roughly 4-6per hpf. Occasional schistocytes.     -Hepatitis Panel, including hepatitis B, C   -stool h.pylori   -HIV testing   -check for pseudothrombocytopenia w/ blue top tube   -d-dimer (consumptive process)    #Small bowel/cecal mass   No lymphadenopathy on CT, awaiting tissue dx    -surgical follow up  -consider tumor markers CEA,   -LDH testing    To be discussed w/ attending

## 2018-01-09 NOTE — CONSULT NOTE ADULT - ASSESSMENT
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 Pt passing flatus and having BMs  - Please prep with Golytely 4L starting at 7 PM tonight  - Clear liquid diet today    # Cecal mass  - Retrograde enteroscopy per above    # Anemia   - Check iron studies  - F/u heme consult  - No e/o acute GI bleeding (i.e. melena, hematochezia, hematemesis)  - Monitor H/H    Case d/w Dr. Campbell  GI will follow

## 2018-01-09 NOTE — H&P ADULT - PSH
H/O exploratory laparotomy  with SBR and appendectomy  Intestinal adhesions with obstruction, unspecified whether partial or complete  diagnostic lap with SANDRO

## 2018-01-09 NOTE — CONSULT NOTE ADULT - PROBLEM SELECTOR RECOMMENDATION 9
- pt with AUB since menarche that has been shown to be controlled w/ OCPs, would defer restarting OCPs until decision about operative management is made, however if general surgery will not proceed with operative management then pt amenable to restarting OCPs  - will defer recommendations of management of anemia and thrombocytopenia to hematology consulting service  - information to f/u with Dr. Jackie Walsh at [address] given to patient, given age of patient, endometrial biopsy not necessarily indicated at this time  - can consider provera 20mg daily for 7-10 days until patient follows-up with GYN as outpatient as well  d/w Dr. Walsh

## 2018-01-09 NOTE — H&P ADULT - HISTORY OF PRESENT ILLNESS
This is a 28 yo female with no significant past medical history, surgical history of intussusception on 2008, in which she underwent ex-lap with SBR and appendectomy in outside hospital ([place]) and complicated by small bowel obstruction s/p diagnostic laparoscopic with lysis of adhesion in 2016. Since then, patient was asymptomatic until last year 2017, she developed pain over the epigastric area and went to [facility] where she had colonoscopy and endoscopy done. She was informed to have polyps. Biopsy was taken and was told that it was a benign polyp.     She presented to [facility] yesterday (1/8) for abdominal pain since Friday (1/5) which related to food intake. The pain is acute in onset, located at epigastric area in the beginning and shifted to RLQ. There was no nausea or vomiting and no fever. Patient went to [facility] and CT scan revealed a left lower quadrant enteroenteric intussusception and  2.3 x 1.8 x 2.1 cm polypoid lesion (small bowel tumor) which projects at the distal aspect of the intussusception, and likely represents a lead point. There is also a  3.1 x 2.9 x 3.1 cm  mass in relation to the inferior pole of cecum. No pneumoperitoneum and no sign of obstruction.    Patient last bowel movement was yesterday and last meal was breakfast this morning. No bloody bowel movement, no diarrhea and no constipation. Passing gas regularly.  Previous colonoscopy was 2017 as mentioned.

## 2018-01-09 NOTE — H&P ADULT - ASSESSMENT
28 yo F with previous abd surgery (ex-lap with SBR 2/2 intussusception and dx-lap with SANDRO 2/2 SBO) p/w acute RLQ abdominal pain, benign abd exam, afebrile, HD stable, no leukocytosis, with normal electrolytes and CT scan suggestive of enteroenteric intussusception in the LLQ with possible small bowel tumor (2.3cm) and mass at inferior pole of cecum. No sign of obstruction, for further management.    Plan :  NPO/IVF LR@100cc/hr  Pain control (IV tylenol/dilaudid PRN), nausea control  Serial abdominal exam Q6  AM labs   OOB/IS 28 yo F with previous abd surgery (ex-lap with SBR 2/2 intussusception and dx-lap with SANDRO 2/2 SBO) p/w acute RLQ abdominal pain, benign abd exam, afebrile, HD stable, no leukocytosis, with normal electrolytes and CT scan suggestive of enteroenteric intussusception in the LLQ with possible small bowel tumor (2.3cm) and mass at inferior pole of cecum. No sign of obstruction, for further management.    Plan :  NPO/IVF LR@100cc/hr  Pain control (IV tylenol/dilaudid PRN), nausea control  Serial abdominal exam Q6  AM labs   OOB/IS  GI/GYN consult in AM

## 2018-01-09 NOTE — CONSULT NOTE ADULT - SUBJECTIVE AND OBJECTIVE BOX
28 yo G0 LMP 2 wks prior w/ hx of AUB and intussusception admitted to general surgery for management of recurrent intussusception. GYN consulted after sonographic findings of thickened endometrium. Pt seen at beside, reports having vaginal bleeding daily for the past two weeks however has been light the past two days. She denies abnormal vaginal discharge, dysuria, pelvic pain.  OBHx: denies  GYNHx: moved from [place] and last saw GYN 1 yr prior, menarche at age 11 and AUB since with irregular menses and menorrhagia, was put on OCPs for 1 yr which regulated menses however AUB recurred after stopping, pt also states she has hx of "many" ovarian cysts, none of which required surgical intervention, she states she has had a pap smear before which was normal, she denies hx of STIs, she has not been sexually active for the past year    PAST MEDICAL & SURGICAL HISTORY:  Intestinal adhesions with obstruction, unspecified whether partial or complete: diagnostic lap with SANDRO  H/O exploratory laparotomy: with SBR and appendectomy  FamHx; denies fam hx of ovarian, uterine, cervical cancer    Vital Signs Last 24 Hrs  T(C): 37 (2018 12:14), Max: 37 (2018 12:14)  T(F): 98.6 (2018 12:14), Max: 98.6 (2018 12:14)  HR: 69 (2018 12:14) (60 - 82)  BP: 89/56 (2018 12:14) (89/56 - 106/65)  BP(mean): --  RR: 16 (2018 12:14) (16 - 17)  SpO2: 98% (2018 12:14) (98% - 100%)  GA: NAD, A+OX3  Abd: soft, +BS, nondistended, mild tenderness on deep palpation of lower quadrants, no rebound or guarding  speculum: cervix appears dilated 1cm, <5cc of dark red blood in vaginal vault, no active bleeding from cervix  pelvic: no CMT, uterus normal in size, anteverted ,nontender, no adnexal masses or fullness appreciated                          7.4    6.2   )-----------( 67       ( 2018 12:42 )             26.3       137  |  105  |  6<L>  ----------------------------<  77  3.9   |  22  |  0.65    Ca    8.4      2018 06:38  Phos  3.3       Mg     1.8         TPro  8.4<H>  /  Alb  3.8  /  TBili  0.2  /  DBili  x   /  AST  25  /  ALT  18  /  AlkPhos  75      Urinalysis Basic - ( 2018 14:14 )    Color: Yellow / Appearance: Clear / S.025 / pH: x  Gluc: x / Ketone: NEGATIVE  / Bili: NEGATIVE / Urobili: 0.2 E.U./dL   Blood: x / Protein: NEGATIVE mg/dL / Nitrite: NEGATIVE   Leuk Esterase: NEGATIVE / RBC: x / WBC x   Sq Epi: x / Non Sq Epi: x / Bacteria: x    < from: US Transvaginal (18 @ 15:35) >    EXAM:  US TRANSVAGINAL                           PROCEDURE DATE:  2018       INTERPRETATION:    PELVIC ULTRASOUND dated 2018 3:35 PM    INDICATION: 27-year-old female with Vaginal bleeding. History of D&C and   anemia. Upperand lower abdominal pain for 3 days. Beta hCG negative. No   history of hormone therapy. LMP: 2017    TECHNIQUE: Transabdominal views of the pelvis were obtained followed by   transvaginal views for better visualization of the ovaries.      PRIOR STUDIES: None    The study is interpreted with a subsequently performed CT abdomen pelvis   exam.    FINDINGS:   These images demonstrate the uterus to be anteverted. The uterus is   normal in size and shape.  The uterus is 7.2 x 3.6 x 5.4 cm.  No   myometrial abnormalities are seen.  Several (less than 5) variable sized   simple appearing nabothian cysts are noted, the largest measuring up to   1.5 cm.  The endometrium is diffusely thickened, measuring up to 2.2 cm ,   with multiple internal tiny cystic components. These findings are most   suggestive of endometrial hyperplasia.    The right ovary is normal in size, measuring up to 2.5 x 1.7 x 2.9 cm.   the left ovary is also normal in size, measuring up to 2.6 x 2.3 x 2.0   cm. there are echogenic foci within both ovaries, which are nonspecific.   Doppler evaluation demonstrates normal vascularity and spectral waveforms   to both ovaries, without evidence of torsion.    Physiologic amount of free fluid is seen in the cul-de-sac.    IMPRESSION:   1.  Diffusely thickened endometrium, most suggestive of endometrial   hyperplasia.    2.  Normal sized bilateral ovaries with innumerable echogenic foci, which   is a nonspecific finding.    3.  Physiologic amount of free fluid in the cul-de-sac.       RECOMMENDATION:  A follow-up transvaginal pelvic ultrasound exam is recommended in one   year, to reevaluate both ovaries.    "Thank you for the opportunity to participate in the care of this   patient."    MERRY HALEY M.D., RADIOLOGY RESIDENT  This document has been electronically signed.  CHRISTAL RAIN M.D., ATTENDING RADIOLOGIST  This document has been electronically signed.  2018  4:54PM       MEDICATIONS  (STANDING):  acetaminophen  IVPB. 700 milliGRAM(s) IV Intermittent once  heparin  Injectable 5000 Unit(s) SubCutaneous every 12 hours  lactated ringers. 1000 milliLiter(s) (100 mL/Hr) IV Continuous <Continuous>  polyethylene glycol/electrolyte Solution. 4000 milliLiter(s) Oral once    MEDICATIONS  (PRN):  ondansetron Injectable 4 milliGRAM(s) IV Push every 6 hours PRN Nausea

## 2018-01-09 NOTE — PROGRESS NOTE ADULT - ASSESSMENT
28 yo F with previous abd surgery (ex-lap with SBR 2/2 intussusception and dx-lap with SANDRO 2/2 SBO) p/w acute RLQ abdominal pain, benign abd exam, afebrile, HD stable, no leukocytosis, with normal electrolytes and CT scan suggestive of enteroenteric intussusception in the LLQ with possible small bowel tumor (2.3cm) and mass at inferior pole of cecum. No sign of obstruction, for further management.    Plan :  NPO/IVF LR@100cc/hr  Pain control (IV tylenol/dilaudid PRN), nausea control  Serial abdominal exam Q6  AM labs   OOB/IS  GI/GYN consult in AM

## 2018-01-09 NOTE — H&P ADULT - NSHPLABSRESULTS_GEN_ALL_CORE
LABS:                        8.3    7.0   )-----------( 62       ( 2018 14:21 )             29.3         138  |  105  |  10  ----------------------------<  89  3.8   |  29  |  0.70    Ca    9.4      2018 14:21    TPro  8.4<H>  /  Alb  3.8  /  TBili  0.2  /  DBili  x   /  AST  25  /  ALT  18  /  AlkPhos  75      PT/INR - ( 2018 14:21 )   PT: 11.1 sec;   INR: 1.01          PTT - ( 2018 14:21 )  PTT:20.7 sec  Urinalysis Basic - ( 2018 14:14 )    Color: Yellow / Appearance: Clear / S.025 / pH: x  Gluc: x / Ketone: NEGATIVE  / Bili: NEGATIVE / Urobili: 0.2 E.U./dL   Blood: x / Protein: NEGATIVE mg/dL / Nitrite: NEGATIVE   Leuk Esterase: NEGATIVE / RBC: x / WBC x   Sq Epi: x / Non Sq Epi: x / Bacteria: x    RADIOLOGY & ADDITIONAL STUDIES:    CT scan revealed.     Left lower quadrant enteroenteric intussusception.   There is a 2.3 x 1.8 x 2.1 cm polypoid lesion which projects at the   distal aspect of the intussusception, and likely represents a lead point.   There is no evidence of pneumatosis or free air. No portal venous gas is   identified. The appendix is not visualized on this examination, however   there are no secondary signs of acute appendicitis. No ascites. Dilated   focal loop of ileum in the right lower quadrant appears to be due to    resection with side-to-side anastomosis. 3.1 x 2.9 x 3.1 cm  mass in   relation to the inferior pole of cecum. Suspicion of subtle circular fold   thickening involving the jejunum. The ingested oral contrast material has   transited to the proximal ascending colon thus no surgical obstruction.    Lymph nodes: No lymphadenopathy.    Aorta/IVC: Normal caliber.    Abdominal wall: No hernia.    Bones/Soft tissues: No acute abnormality.  Bilateral spondylolysis of L5   with grade 1 anterolisthesis of L5 on S1.      IMPRESSION:   1.  Small bowel-small bowel (enteroenteric) intussusception in the left   lower quadrant. 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 Differential diagnosis includes adenomatous   polyp, Meckel's diverticulum, leiomyoma, neurofibroma, and hamartomatous   polyp. No surgical obstruction. Ingested  oral contrast material seen in   the proximal ascending colon.   2.  Mass in relation to the inferior pole of cecum. Differential   diagnosis would include cecal polyp, inverted appendix, ileocecal   intussusception, desmoid. A normal appendix is not identified. Status   post prior small bowel resection.

## 2018-01-10 LAB
ANION GAP SERPL CALC-SCNC: 10 MMOL/L — SIGNIFICANT CHANGE UP (ref 5–17)
APTT BLD: 37.4 SEC — SIGNIFICANT CHANGE UP (ref 27.5–37.4)
BLD GP AB SCN SERPL QL: NEGATIVE — SIGNIFICANT CHANGE UP
BUN SERPL-MCNC: 4 MG/DL — LOW (ref 7–23)
CALCIUM SERPL-MCNC: 9.1 MG/DL — SIGNIFICANT CHANGE UP (ref 8.4–10.5)
CEA SERPL-MCNC: 4.7 NG/ML — HIGH (ref 0–3.8)
CHLORIDE SERPL-SCNC: 104 MMOL/L — SIGNIFICANT CHANGE UP (ref 96–108)
CO2 SERPL-SCNC: 28 MMOL/L — SIGNIFICANT CHANGE UP (ref 22–31)
CREAT SERPL-MCNC: 0.75 MG/DL — SIGNIFICANT CHANGE UP (ref 0.5–1.3)
ERYTHROCYTE [SEDIMENTATION RATE] IN BLOOD: 24 MM/HR — HIGH
FOLATE SERPL-MCNC: >20 NG/ML — SIGNIFICANT CHANGE UP (ref 4.8–24.2)
GLUCOSE SERPL-MCNC: 96 MG/DL — SIGNIFICANT CHANGE UP (ref 70–99)
HAV IGM SER-ACNC: SIGNIFICANT CHANGE UP
HBV CORE IGM SER-ACNC: SIGNIFICANT CHANGE UP
HCT VFR BLD CALC: 26.8 % — LOW (ref 34.5–45)
HCT VFR BLD CALC: 27.3 % — LOW (ref 34.5–45)
HGB BLD-MCNC: 7.7 G/DL — LOW (ref 11.5–15.5)
HGB BLD-MCNC: 7.8 G/DL — LOW (ref 11.5–15.5)
INR BLD: 1.06 — SIGNIFICANT CHANGE UP (ref 0.88–1.16)
MAGNESIUM SERPL-MCNC: 1.7 MG/DL — SIGNIFICANT CHANGE UP (ref 1.6–2.6)
MCHC RBC-ENTMCNC: 18.9 PG — LOW (ref 27–34)
MCHC RBC-ENTMCNC: 19 PG — LOW (ref 27–34)
MCHC RBC-ENTMCNC: 28.6 G/DL — LOW (ref 32–36)
MCHC RBC-ENTMCNC: 28.7 G/DL — LOW (ref 32–36)
MCV RBC AUTO: 66 FL — LOW (ref 80–100)
MCV RBC AUTO: 66.1 FL — LOW (ref 80–100)
PHOSPHATE SERPL-MCNC: 2.7 MG/DL — SIGNIFICANT CHANGE UP (ref 2.5–4.5)
PLATELET # BLD AUTO: 102 K/UL — LOW (ref 150–400)
PLATELET # BLD AUTO: 125 K/UL — LOW (ref 150–400)
POTASSIUM SERPL-MCNC: 4.7 MMOL/L — SIGNIFICANT CHANGE UP (ref 3.5–5.3)
POTASSIUM SERPL-SCNC: 4.7 MMOL/L — SIGNIFICANT CHANGE UP (ref 3.5–5.3)
PROTHROM AB SERPL-ACNC: 11.8 SEC — SIGNIFICANT CHANGE UP (ref 9.8–12.7)
RBC # BLD: 4.06 M/UL — SIGNIFICANT CHANGE UP (ref 3.8–5.2)
RBC # BLD: 4.13 M/UL — SIGNIFICANT CHANGE UP (ref 3.8–5.2)
RBC # FLD: 22.1 % — HIGH (ref 10.3–16.9)
RBC # FLD: 22.2 % — HIGH (ref 10.3–16.9)
RH IG SCN BLD-IMP: POSITIVE — SIGNIFICANT CHANGE UP
SODIUM SERPL-SCNC: 142 MMOL/L — SIGNIFICANT CHANGE UP (ref 135–145)
VIT B12 SERPL-MCNC: 1374 PG/ML — HIGH (ref 232–1245)
WBC # BLD: 5.7 K/UL — SIGNIFICANT CHANGE UP (ref 3.8–10.5)
WBC # BLD: 6.6 K/UL — SIGNIFICANT CHANGE UP (ref 3.8–10.5)
WBC # FLD AUTO: 5.7 K/UL — SIGNIFICANT CHANGE UP (ref 3.8–10.5)
WBC # FLD AUTO: 6.6 K/UL — SIGNIFICANT CHANGE UP (ref 3.8–10.5)

## 2018-01-10 PROCEDURE — 99231 SBSQ HOSP IP/OBS SF/LOW 25: CPT | Mod: GC

## 2018-01-10 PROCEDURE — 45385 COLONOSCOPY W/LESION REMOVAL: CPT

## 2018-01-10 PROCEDURE — 93010 ELECTROCARDIOGRAM REPORT: CPT

## 2018-01-10 PROCEDURE — 44799D: CUSTOM

## 2018-01-10 RX ORDER — IRON SUCROSE 20 MG/ML
200 INJECTION, SOLUTION INTRAVENOUS EVERY 24 HOURS
Qty: 0 | Refills: 0 | Status: DISCONTINUED | OUTPATIENT
Start: 2018-01-10 | End: 2018-01-12

## 2018-01-10 RX ADMIN — SODIUM CHLORIDE 100 MILLILITER(S): 9 INJECTION, SOLUTION INTRAVENOUS at 00:01

## 2018-01-10 RX ADMIN — IRON SUCROSE 110 MILLIGRAM(S): 20 INJECTION, SOLUTION INTRAVENOUS at 13:57

## 2018-01-10 RX ADMIN — HEPARIN SODIUM 5000 UNIT(S): 5000 INJECTION INTRAVENOUS; SUBCUTANEOUS at 06:05

## 2018-01-10 NOTE — PROGRESS NOTE ADULT - SUBJECTIVE AND OBJECTIVE BOX
ON : Pain is controlled with tylenol. Bowel prep started at 7pm. c/o bleeding (?menses and BRBPR) with clots. Hepatitis panel neg, HIV neg, reticulocyte WNL, Iron study WNL.  Serial CBC @12am:  Hb stable at 7.8  1/9: AM hgb 6.8; repeat 7.4; Hem onc consulted- pending lab results, GI rec: retrograde enteroscopy tomorrow, 1 unit on hold; pending gyn consult. passing gas/ +BM ON : Pain is controlled with tylenol. Bowel prep started at 7pm. c/o bleeding (?menses and BRBPR) with clots. Hepatitis panel neg, HIV neg, reticulocyte WNL, Iron study WNL.  Serial CBC @12am:  Hb stable at 7.8  1/9: AM hgb 6.8; repeat 7.4; Hem onc consulted- pending lab results, GI rec: retrograde enteroscopy tomorrow, 1 unit on hold; pending gyn consult. passing gas/ +BM        SUBJECTIVE: pain well controlled  Flatus: [x ] YES [ ] NO             Bowel Movement: [x ] YES [ ] NO  Pain (0-10):            Pain Control Adequate: [x ] YES [ ] NO  Nausea: [ ] YES [x ] NO            Vomiting: [ ] YES [x ] NO  Diarrhea: [ ] YES [x ] NO         Constipation: [ ] YES [x ] NO     Chest Pain: [ ] YES [x ] NO    SOB:  [ ] YES [x ] NO    MEDICATIONS  (STANDING):  acetaminophen  IVPB. 700 milliGRAM(s) IV Intermittent once  heparin  Injectable 5000 Unit(s) SubCutaneous every 12 hours  lactated ringers. 1000 milliLiter(s) (100 mL/Hr) IV Continuous <Continuous>    MEDICATIONS  (PRN):  ondansetron Injectable 4 milliGRAM(s) IV Push every 6 hours PRN Nausea      Vital Signs Last 24 Hrs  T(C): 36.6 (10 Shaun 2018 05:44), Max: 37 (2018 12:14)  T(F): 97.9 (10 Shaun 2018 05:44), Max: 98.6 (2018 12:14)  HR: 73 (10 Shaun 2018 05:44) (69 - 88)  BP: 91/57 (10 Shaun 2018 05:44) (89/56 - 106/65)  BP(mean): --  RR: 16 (10 Shaun 2018 05:44) (16 - 17)  SpO2: 100% (10 Shaun 2018 05:44) (98% - 100%)    PHYSICAL EXAM:      Constitutional: A&Ox3      Respiratory: non labored breathing, no respiratory distress    Cardiovascular: NSR, RRR    Gastrointestinal: Soft ND, decrease tenderness in RLQ                    Genitourinary: voiding    Extremities: (-) edema                  I&O's Detail    2018 07:01  -  10 Shaun 2018 07:00  --------------------------------------------------------  IN:    lactated ringers.: 1100 mL    Oral Fluid: 220 mL  Total IN: 1320 mL    OUT:    Voided: 700 mL  Total OUT: 700 mL    Total NET: 620 mL      10 Shaun 2018 07:01  -  10 Shaun 2018 07:10  --------------------------------------------------------  IN:    lactated ringers.: 100 mL  Total IN: 100 mL    OUT:  Total OUT: 0 mL    Total NET: 100 mL          LABS:                        7.8    6.6   )-----------( 102      ( 10 Shaun 2018 00:48 )             27.3         137  |  105  |  6<L>  ----------------------------<  77  3.9   |  22  |  0.65    Ca    8.4      2018 06:38  Phos  3.3       Mg     1.8         TPro  x   /  Alb  x   /  TBili  x   /  DBili  <0.2  /  AST  x   /  ALT  x   /  AlkPhos  x       PT/INR - ( 2018 14:21 )   PT: 11.1 sec;   INR: 1.01          PTT - ( 2018 14:21 )  PTT:20.7 sec  Urinalysis Basic - ( 2018 14:14 )    Color: Yellow / Appearance: Clear / S.025 / pH: x  Gluc: x / Ketone: NEGATIVE  / Bili: NEGATIVE / Urobili: 0.2 E.U./dL   Blood: x / Protein: NEGATIVE mg/dL / Nitrite: NEGATIVE   Leuk Esterase: NEGATIVE / RBC: x / WBC x   Sq Epi: x / Non Sq Epi: x / Bacteria: x        RADIOLOGY & ADDITIONAL STUDIES:

## 2018-01-10 NOTE — PROGRESS NOTE ADULT - ASSESSMENT
26 yo F with previous abd surgery (ex-lap with SBR 2/2 intussusception and ex-lap with SANDRO 2/2 SBO) p/w acute RLQ abdominal pain, benign abd exam, afebrile, HD stable, no leukocytosis, with normal electrolytes and CT scan suggestive of enteroenteric intussusception in the LLQ with possible small bowel tumor (2.3cm) and mass at inferior pole of cecum. No sign of obstruction, for further management.    Plan :  For retrograde enteroscopy today with GI  NPO/IVF LR@100cc/hr  Pain control (IV tylenol/dilaudid PRN), nausea control  Serial abdominal exam Q6  AM labs   OOB/IS  Follow up Heme/onc recs

## 2018-01-11 ENCOUNTER — RESULT REVIEW (OUTPATIENT)
Age: 28
End: 2018-01-11

## 2018-01-11 LAB
ALBUMIN SERPL ELPH-MCNC: 3.9 G/DL — SIGNIFICANT CHANGE UP (ref 3.3–5)
ALP SERPL-CCNC: 62 U/L — SIGNIFICANT CHANGE UP (ref 40–120)
ALT FLD-CCNC: 11 U/L — SIGNIFICANT CHANGE UP (ref 10–45)
ANA TITR SER: NEGATIVE — SIGNIFICANT CHANGE UP
ANION GAP SERPL CALC-SCNC: 13 MMOL/L — SIGNIFICANT CHANGE UP (ref 5–17)
ANION GAP SERPL CALC-SCNC: 13 MMOL/L — SIGNIFICANT CHANGE UP (ref 5–17)
APTT BLD: 53.6 SEC — HIGH (ref 27.5–37.4)
AST SERPL-CCNC: 25 U/L — SIGNIFICANT CHANGE UP (ref 10–40)
BILIRUB SERPL-MCNC: 0.4 MG/DL — SIGNIFICANT CHANGE UP (ref 0.2–1.2)
BUN SERPL-MCNC: 4 MG/DL — LOW (ref 7–23)
BUN SERPL-MCNC: 5 MG/DL — LOW (ref 7–23)
CALCIUM SERPL-MCNC: 8.8 MG/DL — SIGNIFICANT CHANGE UP (ref 8.4–10.5)
CALCIUM SERPL-MCNC: 8.9 MG/DL — SIGNIFICANT CHANGE UP (ref 8.4–10.5)
CANCER AG19-9 SERPL-ACNC: <1 U/ML — SIGNIFICANT CHANGE UP
CHLORIDE SERPL-SCNC: 102 MMOL/L — SIGNIFICANT CHANGE UP (ref 96–108)
CHLORIDE SERPL-SCNC: 102 MMOL/L — SIGNIFICANT CHANGE UP (ref 96–108)
CO2 SERPL-SCNC: 23 MMOL/L — SIGNIFICANT CHANGE UP (ref 22–31)
CO2 SERPL-SCNC: 24 MMOL/L — SIGNIFICANT CHANGE UP (ref 22–31)
CREAT SERPL-MCNC: 0.68 MG/DL — SIGNIFICANT CHANGE UP (ref 0.5–1.3)
CREAT SERPL-MCNC: 0.76 MG/DL — SIGNIFICANT CHANGE UP (ref 0.5–1.3)
GLUCOSE SERPL-MCNC: 123 MG/DL — HIGH (ref 70–99)
GLUCOSE SERPL-MCNC: 75 MG/DL — SIGNIFICANT CHANGE UP (ref 70–99)
HCT VFR BLD CALC: 25.2 % — LOW (ref 34.5–45)
HCT VFR BLD CALC: 26.7 % — LOW (ref 34.5–45)
HGB BLD-MCNC: 7.2 G/DL — LOW (ref 11.5–15.5)
HGB BLD-MCNC: 7.8 G/DL — LOW (ref 11.5–15.5)
INR BLD: 1.03 — SIGNIFICANT CHANGE UP (ref 0.88–1.16)
LACTATE SERPL-SCNC: 1.4 MMOL/L — SIGNIFICANT CHANGE UP (ref 0.5–2)
MAGNESIUM SERPL-MCNC: 1.5 MG/DL — LOW (ref 1.6–2.6)
MAGNESIUM SERPL-MCNC: 2.3 MG/DL — SIGNIFICANT CHANGE UP (ref 1.6–2.6)
MCHC RBC-ENTMCNC: 19.2 PG — LOW (ref 27–34)
MCHC RBC-ENTMCNC: 19.2 PG — LOW (ref 27–34)
MCHC RBC-ENTMCNC: 28.6 G/DL — LOW (ref 32–36)
MCHC RBC-ENTMCNC: 29.2 G/DL — LOW (ref 32–36)
MCV RBC AUTO: 65.8 FL — LOW (ref 80–100)
MCV RBC AUTO: 67.2 FL — LOW (ref 80–100)
PHOSPHATE SERPL-MCNC: 1.5 MG/DL — LOW (ref 2.5–4.5)
PHOSPHATE SERPL-MCNC: 3.3 MG/DL — SIGNIFICANT CHANGE UP (ref 2.5–4.5)
PLATELET # BLD AUTO: 166 K/UL — SIGNIFICANT CHANGE UP (ref 150–400)
PLATELET # BLD AUTO: 234 K/UL — SIGNIFICANT CHANGE UP (ref 150–400)
POTASSIUM SERPL-MCNC: 3.6 MMOL/L — SIGNIFICANT CHANGE UP (ref 3.5–5.3)
POTASSIUM SERPL-MCNC: 4.2 MMOL/L — SIGNIFICANT CHANGE UP (ref 3.5–5.3)
POTASSIUM SERPL-SCNC: 3.6 MMOL/L — SIGNIFICANT CHANGE UP (ref 3.5–5.3)
POTASSIUM SERPL-SCNC: 4.2 MMOL/L — SIGNIFICANT CHANGE UP (ref 3.5–5.3)
PROT SERPL-MCNC: 7.4 G/DL — SIGNIFICANT CHANGE UP (ref 6–8.3)
PROTHROM AB SERPL-ACNC: 11.4 SEC — SIGNIFICANT CHANGE UP (ref 9.8–12.7)
RBC # BLD: 3.75 M/UL — LOW (ref 3.8–5.2)
RBC # BLD: 4.06 M/UL — SIGNIFICANT CHANGE UP (ref 3.8–5.2)
RBC # FLD: 22.9 % — HIGH (ref 10.3–16.9)
RBC # FLD: 22.9 % — HIGH (ref 10.3–16.9)
SODIUM SERPL-SCNC: 138 MMOL/L — SIGNIFICANT CHANGE UP (ref 135–145)
SODIUM SERPL-SCNC: 139 MMOL/L — SIGNIFICANT CHANGE UP (ref 135–145)
WBC # BLD: 8 K/UL — SIGNIFICANT CHANGE UP (ref 3.8–10.5)
WBC # BLD: 9.1 K/UL — SIGNIFICANT CHANGE UP (ref 3.8–10.5)
WBC # FLD AUTO: 8 K/UL — SIGNIFICANT CHANGE UP (ref 3.8–10.5)
WBC # FLD AUTO: 9.1 K/UL — SIGNIFICANT CHANGE UP (ref 3.8–10.5)

## 2018-01-11 PROCEDURE — 99231 SBSQ HOSP IP/OBS SF/LOW 25: CPT | Mod: GC

## 2018-01-11 PROCEDURE — 74019 RADEX ABDOMEN 2 VIEWS: CPT | Mod: 26

## 2018-01-11 RX ORDER — HYDROMORPHONE HYDROCHLORIDE 2 MG/ML
1 INJECTION INTRAMUSCULAR; INTRAVENOUS; SUBCUTANEOUS ONCE
Qty: 0 | Refills: 0 | Status: DISCONTINUED | OUTPATIENT
Start: 2018-01-11 | End: 2018-01-11

## 2018-01-11 RX ORDER — DIATRIZOATE MEGLUMINE 180 MG/ML
30 INJECTION, SOLUTION INTRAVESICAL ONCE
Qty: 0 | Refills: 0 | Status: COMPLETED | OUTPATIENT
Start: 2018-01-11 | End: 2018-01-11

## 2018-01-11 RX ORDER — MAGNESIUM SULFATE 500 MG/ML
2 VIAL (ML) INJECTION
Qty: 0 | Refills: 0 | Status: COMPLETED | OUTPATIENT
Start: 2018-01-11 | End: 2018-01-11

## 2018-01-11 RX ORDER — KETOROLAC TROMETHAMINE 30 MG/ML
15 SYRINGE (ML) INJECTION ONCE
Qty: 0 | Refills: 0 | Status: DISCONTINUED | OUTPATIENT
Start: 2018-01-11 | End: 2018-01-11

## 2018-01-11 RX ORDER — POTASSIUM PHOSPHATE, MONOBASIC POTASSIUM PHOSPHATE, DIBASIC 236; 224 MG/ML; MG/ML
30 INJECTION, SOLUTION INTRAVENOUS ONCE
Qty: 0 | Refills: 0 | Status: COMPLETED | OUTPATIENT
Start: 2018-01-11 | End: 2018-01-11

## 2018-01-11 RX ORDER — SODIUM CHLORIDE 9 MG/ML
1000 INJECTION, SOLUTION INTRAVENOUS
Qty: 0 | Refills: 0 | Status: DISCONTINUED | OUTPATIENT
Start: 2018-01-11 | End: 2018-01-16

## 2018-01-11 RX ADMIN — POTASSIUM PHOSPHATE, MONOBASIC POTASSIUM PHOSPHATE, DIBASIC 85 MILLIMOLE(S): 236; 224 INJECTION, SOLUTION INTRAVENOUS at 23:21

## 2018-01-11 RX ADMIN — HEPARIN SODIUM 5000 UNIT(S): 5000 INJECTION INTRAVENOUS; SUBCUTANEOUS at 05:42

## 2018-01-11 RX ADMIN — ONDANSETRON 4 MILLIGRAM(S): 8 TABLET, FILM COATED ORAL at 21:45

## 2018-01-11 RX ADMIN — HYDROMORPHONE HYDROCHLORIDE 1 MILLIGRAM(S): 2 INJECTION INTRAMUSCULAR; INTRAVENOUS; SUBCUTANEOUS at 21:15

## 2018-01-11 RX ADMIN — Medication 50 GRAM(S): at 13:44

## 2018-01-11 RX ADMIN — Medication 50 GRAM(S): at 09:48

## 2018-01-11 RX ADMIN — HYDROMORPHONE HYDROCHLORIDE 1 MILLIGRAM(S): 2 INJECTION INTRAMUSCULAR; INTRAVENOUS; SUBCUTANEOUS at 21:30

## 2018-01-11 RX ADMIN — SODIUM CHLORIDE 100 MILLILITER(S): 9 INJECTION, SOLUTION INTRAVENOUS at 12:34

## 2018-01-11 RX ADMIN — DIATRIZOATE MEGLUMINE 30 MILLILITER(S): 180 INJECTION, SOLUTION INTRAVESICAL at 23:21

## 2018-01-11 RX ADMIN — IRON SUCROSE 110 MILLIGRAM(S): 20 INJECTION, SOLUTION INTRAVENOUS at 13:44

## 2018-01-11 RX ADMIN — HEPARIN SODIUM 5000 UNIT(S): 5000 INJECTION INTRAVENOUS; SUBCUTANEOUS at 18:09

## 2018-01-11 NOTE — PROGRESS NOTE ADULT - ASSESSMENT
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 melena, hematochezia, hematemesis)  - Monitor H/H, transfuse if Hgb <7  - Further GI work-up per above  - Please obtain EGD and Colonoscopy report from 2016    Case d/w Dr. Campbell  GI will follow

## 2018-01-11 NOTE — DIETITIAN INITIAL EVALUATION ADULT. - ENERGY NEEDS
Height: 4'11" Weight: 110lbs, IBW 98lbs+/-10%, %%, BMI 22.2  ABW used for calculations as pt between % of IBW.   Nutrient needs based on [facility] standards of care for maintenance in adults.

## 2018-01-11 NOTE — PROGRESS NOTE ADULT - SUBJECTIVE AND OBJECTIVE BOX
Pt seen and examined at bedside. MARY overnight. Reports RLQ abdominal pain, denies nausea, vomiting, melena, or hematochezia. Reports persistent vaginal spotting. Has not had a BM or passed flatus.     Allergies  No Known Allergies    MEDICATIONS:  MEDICATIONS  (STANDING):  acetaminophen  IVPB. 700 milliGRAM(s) IV Intermittent once  dextrose 5% + sodium chloride 0.45%. 1000 milliLiter(s) (100 mL/Hr) IV Continuous <Continuous>  heparin  Injectable 5000 Unit(s) SubCutaneous every 12 hours  iron sucrose IVPB 200 milliGRAM(s) IV Intermittent every 24 hours  magnesium sulfate  IVPB 2 Gram(s) IV Intermittent every 1 hour    MEDICATIONS  (PRN):  ondansetron Injectable 4 milliGRAM(s) IV Push every 6 hours PRN Nausea    Vital Signs Last 24 Hrs  T(C): 36.8 (11 Jan 2018 05:00), Max: 37.1 (11 Jan 2018 00:06)  T(F): 98.3 (11 Jan 2018 05:00), Max: 98.8 (11 Jan 2018 00:06)  HR: 84 (11 Jan 2018 05:00) (75 - 99)  BP: 105/65 (11 Jan 2018 05:00) (99/63 - 111/71)  BP(mean): --  RR: 17 (11 Jan 2018 05:00) (16 - 17)  SpO2: 99% (11 Jan 2018 05:00) (99% - 100%)    01-10 @ 07:01  -  01-11 @ 07:00  --------------------------------------------------------  IN: 1200 mL / OUT: 700 mL / NET: 500 mL    PHYSICAL EXAM:    General: Well developed; well nourished; in no acute distress  HEENT: MMM, conjunctiva and sclera clear  Gastrointestinal: Soft RLQ TTP non-distended; No rebound or guarding  Skin: Warm and dry. No obvious rash    LABS:                        7.2    9.1   )-----------( 166      ( 11 Jan 2018 08:20 )             25.2     01-11    138  |  102  |  4<L>  ----------------------------<  75  4.2   |  23  |  0.68    Ca    8.8      11 Jan 2018 08:20  Phos  3.3     01-11  Mg     1.5     01-11    TPro  x   /  Alb  x   /  TBili  x   /  DBili  <0.2  /  AST  x   /  ALT  x   /  AlkPhos  x   01-09    PT/INR - ( 10 Shaun 2018 07:51 )   PT: 11.8 sec;   INR: 1.06       PTT - ( 10 Shaun 2018 07:51 )  PTT:37.4 sec    RADIOLOGY & ADDITIONAL STUDIES: No new radiologic studies

## 2018-01-11 NOTE — DIETITIAN INITIAL EVALUATION ADULT. - OTHER INFO
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 Pt NPO except ice chips and water. Last BM was after receiving GoLytely yesterday. Has not had solid foods since monday 1/8. Denies GI distress except feels like she has to have a BM. NKFA or dietary restrictions. Skin and GI WDL per flowsheet.

## 2018-01-11 NOTE — PROGRESS NOTE ADULT - ASSESSMENT
26 yo F with previous abd surgery (ex-lap with SBR 2/2 intussusception and ex-lap with SANDRO 2/2 SBO) p/w acute RLQ abdominal pain, s/p retrograde enteroscopy 1/11.     Plan :  For MRE as per GI  NPO/IVF D5 1/2NS@100cc/hr  Pain control (IV tylenol/dilaudid PRN), nausea control  AM labs   OOB/IS

## 2018-01-12 ENCOUNTER — RESULT REVIEW (OUTPATIENT)
Age: 28
End: 2018-01-12

## 2018-01-12 LAB
HEMOGLOBIN INTERPRETATION: SIGNIFICANT CHANGE UP
HGB A MFR BLD: 98.3 % — HIGH (ref 95.8–98)
HGB A2 MFR BLD: 1.7 % — LOW (ref 2–3.2)
SURGICAL PATHOLOGY STUDY: SIGNIFICANT CHANGE UP

## 2018-01-12 PROCEDURE — 71045 X-RAY EXAM CHEST 1 VIEW: CPT | Mod: 26

## 2018-01-12 PROCEDURE — 74177 CT ABD & PELVIS W/CONTRAST: CPT | Mod: 26

## 2018-01-12 RX ORDER — ACETAMINOPHEN 500 MG
1000 TABLET ORAL EVERY 8 HOURS
Qty: 0 | Refills: 0 | Status: DISCONTINUED | OUTPATIENT
Start: 2018-01-12 | End: 2018-01-13

## 2018-01-12 RX ORDER — HYDROMORPHONE HYDROCHLORIDE 2 MG/ML
0.5 INJECTION INTRAMUSCULAR; INTRAVENOUS; SUBCUTANEOUS ONCE
Qty: 0 | Refills: 0 | Status: DISCONTINUED | OUTPATIENT
Start: 2018-01-12 | End: 2018-01-12

## 2018-01-12 RX ORDER — METOCLOPRAMIDE HCL 10 MG
10 TABLET ORAL ONCE
Qty: 0 | Refills: 0 | Status: DISCONTINUED | OUTPATIENT
Start: 2018-01-12 | End: 2018-01-17

## 2018-01-12 RX ORDER — IRON SUCROSE 20 MG/ML
200 INJECTION, SOLUTION INTRAVENOUS EVERY 24 HOURS
Qty: 0 | Refills: 0 | Status: COMPLETED | OUTPATIENT
Start: 2018-01-12 | End: 2018-01-14

## 2018-01-12 RX ORDER — BENZOCAINE AND MENTHOL 5; 1 G/100ML; G/100ML
1 LIQUID ORAL ONCE
Qty: 0 | Refills: 0 | Status: COMPLETED | OUTPATIENT
Start: 2018-01-12 | End: 2018-01-12

## 2018-01-12 RX ADMIN — IRON SUCROSE 110 MILLIGRAM(S): 20 INJECTION, SOLUTION INTRAVENOUS at 14:04

## 2018-01-12 RX ADMIN — HYDROMORPHONE HYDROCHLORIDE 0.5 MILLIGRAM(S): 2 INJECTION INTRAMUSCULAR; INTRAVENOUS; SUBCUTANEOUS at 04:00

## 2018-01-12 RX ADMIN — SODIUM CHLORIDE 100 MILLILITER(S): 9 INJECTION, SOLUTION INTRAVENOUS at 14:04

## 2018-01-12 RX ADMIN — HYDROMORPHONE HYDROCHLORIDE 0.5 MILLIGRAM(S): 2 INJECTION INTRAMUSCULAR; INTRAVENOUS; SUBCUTANEOUS at 04:57

## 2018-01-12 RX ADMIN — HEPARIN SODIUM 5000 UNIT(S): 5000 INJECTION INTRAVENOUS; SUBCUTANEOUS at 05:39

## 2018-01-12 RX ADMIN — ONDANSETRON 4 MILLIGRAM(S): 8 TABLET, FILM COATED ORAL at 23:24

## 2018-01-12 NOTE — PROGRESS NOTE ADULT - SUBJECTIVE AND OBJECTIVE BOX
o/n: acute lower abdominal pain + guarding/ + peritoneal; vitals normal; lactate 1.4;  emesis x2. Unable to tolerate gastrograffin. NGT inserted. CT acan done. Upright abd x-ray revealed air fluid level in the small bowel suggestive of SBO.  1/11: CA-19.9 negative; inc LLQ in PM; MRE pending; MRE canceled; restarted on diet; NPO at midnight o/n: acute lower abdominal pain + guarding/ + peritoneal; vitals normal; lactate 1.4;  emesis x2. Unable to tolerate gastrograffin. NGT inserted. CT acan done. Upright abd x-ray revealed air fluid level in the small bowel suggestive of SBO.  1/11: CA-19.9 negative; inc LLQ in PM; MRE pending; MRE canceled; restarted on diet; NPO at midnight        SUBJECTIVE: Patient seen and examined bedside by surgery team. Patient reports improvement since last night's intense 10/10 abdominal pain. Persistent pain but not as severe as last night. 2 episodes of emesis. No BM or flatus. Denies fevers, chills,         heparin  Injectable 5000 Unit(s) SubCutaneous every 12 hours      Vital Signs Last 24 Hrs  T(C): 36.6 (12 Jan 2018 05:35), Max: 37.1 (11 Jan 2018 14:12)  T(F): 97.9 (12 Jan 2018 05:35), Max: 98.8 (11 Jan 2018 14:12)  HR: 69 (12 Jan 2018 05:35) (69 - 84)  BP: 99/63 (12 Jan 2018 05:35) (98/66 - 111/73)  BP(mean): --  RR: 16 (12 Jan 2018 05:35) (16 - 17)  SpO2: 98% (12 Jan 2018 05:35) (98% - 100%)  I&O's Detail    11 Jan 2018 07:01  -  12 Jan 2018 07:00  --------------------------------------------------------  IN:    dextrose 5% + sodium chloride 0.45%.: 2500 mL    Oral Fluid: 400 mL  Total IN: 2900 mL    OUT:    Emesis: 450 mL    Voided: 950 mL  Total OUT: 1400 mL    Total NET: 1500 mL            Physical Exam  General: NAD, alert, interactive, appears comfortabl  Pulm: Nonlabored breathing, no respiratory distress  Abd: softly distended, tender to palpation in lower abdomen bilaterally; + guarding  Extrem: WWP, no edema, SCDs in place        LABS:                        7.8    8.0   )-----------( 234      ( 11 Jan 2018 20:53 )             26.7     01-11    139  |  102  |  5<L>  ----------------------------<  123<H>  3.6   |  24  |  0.76    Ca    8.9      11 Jan 2018 20:53  Phos  1.5     01-11  Mg     2.3     01-11    TPro  7.4  /  Alb  3.9  /  TBili  0.4  /  DBili  x   /  AST  25  /  ALT  11  /  AlkPhos  62  01-11    PT/INR - ( 11 Jan 2018 20:52 )   PT: 11.4 sec;   INR: 1.03          PTT - ( 11 Jan 2018 20:52 )  PTT:53.6 sec      RADIOLOGY & ADDITIONAL STUDIES:    < from: CT Abdomen and Pelvis w/ Oral Cont and w/ IV Cont (01.12.18 @ 05:05) >  Interval progression of the enteroenteric intussusception with   significant wall thickening and edema.  2.5 cm intraluminal mass is probably the lead point..  Several small bowel loops dilated up to 4 cm suggesting some degree of   obstruction..  Interval development of tiny pleural effusions and small amount of   ascites.  Minor findings described above.    < end of copied text >

## 2018-01-12 NOTE — BRIEF OPERATIVE NOTE - VENOUS THROMBOEMBOLISM PROPHYLAXIS THERAPY
, due , 31.2 weeks  : Pt presents to L&D with c/o a possible yeast infection that started about 2 weeks ago. Pt describes it as itching, burning, white discharge. Pt states she tried to clear it up with Vagicane but that did not work so she tried some home remedies using apple cider vinegar and ate some greek yogurt. Pt also reports irregular UCs (none since she's been here), and decreased FM. EFM/[place] placed. VSS. Denies LOF or VB.  174: Dr. Jackson notified of pt.  1800: Dr. Jackson at bedside. Orders received for discharge with an Rx.  : Pt discharged home via ambulation with FOB. General L&D instructions given. Pt verbalized understanding.  
Progress Note    Subjective: 17-year-old G1 at 31 weeks and 2/7 days gestation, patient is here complaining of a yeast infection. She states that she had itching and burning a few weeks ago when she took some vagi-vcaine but then the yeast infection got worse and then she realized she was allergic to it, it then got better then she used a vinegar sitz bath and ate yogurt which did not improve the yeast infection. She is here now because she also states that she has a contraction every few hours and the one she had 3:30 in the afternoon really hurt. She reports good fetal movement no loss of fluid no vaginal bleeding. She is not complaining of contractions now but states that she anticipates having one in the next few hours.        Objective Data:            Vitals:    10/05/17 1727   BP: 125/65   Pulse: (!) 106   Temp: 37 °C (98.6 °F)       fht 130s reactive no decelerations  [place] shows no contractions    No current facility-administered medications for this encounter.        Assessment:Patient is given  labor precautions    Plan: Prescription for Terazol  Follow-up pregnancy center as scheduled on Monday  
SCDs

## 2018-01-12 NOTE — PROGRESS NOTE ADULT - SUBJECTIVE AND OBJECTIVE BOX
Pt seen and examined at bedside. Overnight pt noted to have worsening abdominal pain, nausea, and vomiting. CT scan and Abd XR suggestive of SBO. NGT placed. Pt reports improved abdominal pain this morning and nausea.    Allergies    No Known Allergies    MEDICATIONS:  MEDICATIONS  (STANDING):  benzocaine 15 mG/menthol 3.6 mG Lozenge 1 Lozenge Oral once  dextrose 5% + sodium chloride 0.45%. 1000 milliLiter(s) (100 mL/Hr) IV Continuous <Continuous>  heparin  Injectable 5000 Unit(s) SubCutaneous every 12 hours  iron sucrose IVPB 200 milliGRAM(s) IV Intermittent every 24 hours  metoclopramide Injectable 10 milliGRAM(s) IV Push once    MEDICATIONS  (PRN):  acetaminophen  IVPB. 1000 milliGRAM(s) IV Intermittent every 8 hours PRN Severe Pain (7 - 10)  ondansetron Injectable 4 milliGRAM(s) IV Push every 6 hours PRN Nausea    Vital Signs Last 24 Hrs  T(C): 37.7 (12 Jan 2018 17:45), Max: 37.7 (12 Jan 2018 17:45)  T(F): 99.9 (12 Jan 2018 17:45), Max: 99.9 (12 Jan 2018 17:45)  HR: 81 (12 Jan 2018 17:45) (69 - 85)  BP: 104/66 (12 Jan 2018 17:45) (99/63 - 111/73)  BP(mean): --  RR: 16 (12 Jan 2018 17:45) (16 - 16)  SpO2: 98% (12 Jan 2018 17:45) (98% - 100%)    01-11 @ 07:01 - 01-12 @ 07:00  --------------------------------------------------------  IN: 2900 mL / OUT: 1400 mL / NET: 1500 mL    01-12 @ 07:01 - 01-12 @ 19:24  --------------------------------------------------------  IN: 0 mL / OUT: 800 mL / NET: -800 mL    PHYSICAL EXAM:    General: Frail; in no acute distress, NGT in place  HEENT: MMM, conjunctiva and sclera clear  Gastrointestinal: Soft RLQ and LLQ TTP mildly distended; . No rebound or guarding  Skin: Warm and dry. No obvious rash    LABS:                        7.8    8.0   )-----------( 234      ( 11 Jan 2018 20:53 )             26.7     01-11    139  |  102  |  5<L>  ----------------------------<  123<H>  3.6   |  24  |  0.76    Ca    8.9      11 Jan 2018 20:53  Phos  1.5     01-11  Mg     2.3     01-11    TPro  7.4  /  Alb  3.9  /  TBili  0.4  /  DBili  x   /  AST  25  /  ALT  11  /  AlkPhos  62  01-11    PT/INR - ( 11 Jan 2018 20:52 )   PT: 11.4 sec;   INR: 1.03       PTT - ( 11 Jan 2018 20:52 )  PTT:53.6 sec    RADIOLOGY & ADDITIONAL STUDIES:  CT Abdomen and Pelvis w/ Oral Cont and w/ IV Cont (01.12.18 @ 05:05)  - On the current exam, there is no egress of enteric contrast through the   area of intussusception.    - The previously described masslike lesion in relation to the inferior   pole of the cecum is again noted.    - Redemonstrated is an apparent small bowel anastomosis in the right   abdomen.    FINDINGS:  Lower thorax: Tiny pleural effusions. Dependent atelectasis in posterior   lung bases.  Liver: No significant abnormalities.  Gallbladder and bile ducts: No significant abnormalities.  Pancreas: 5 mm pancreatic body cyst. No mass, inflammation or duct   dilatation.  Spleen: No enlargement.  Adrenals: No mass.  Kidneys and ureters: No significant abnormalities.  Stomach and bowel: Interval progression of the enteroenteric   intussusception now measuring about  12 cm in length with associated wall thickening and edema. 2.5   cm intraluminal mass is probably the  lead point..  Abnormal thickening of several segments of jejunum suggesting infection   or other inflammatory  process.  Mild dilatation of several small bowel loops measuring up to 4 cm in   diameter suggesting some  degree of obstruction.  Staples indicate previous small bowel surgery.  Appendix: Staples around cecum suggest appendectomy.  Bladder: No significant abnormalities.  Reproductive: Nonspecific endometrial thickening. Normal overall uterus   size.  Several nabothian cysts measuring up to 1.5 cm.  Intraperitoneal space: Small amount of ascites. Total volume less than 50   cc.  Bones/joints: Nosignificant abnormalities.  Soft tissues: Unremarkable as visualized.  Vasculature: No abdominal aortic aneurysm.  Lymph nodes: No lymphadenopathy.  Tubes, lines and devices: NG tube in stomach.    IMPRESSION:  Interval progression of the enteroenteric intussusception with   significant wall thickening and edema.  2.5 cm intraluminal mass is probably the lead point..  Several small bowel loops dilated up to 4 cm suggesting some degree of   obstruction..  Interval development of tiny pleural effusions and small amount of   ascites.  Minor findings described above.

## 2018-01-12 NOTE — PROGRESS NOTE ADULT - ASSESSMENT
26 yo F with previous abd surgery (ex-lap with SBR 2/2 intussusception and ex-lap with SANDRO 2/2 SBO) p/w acute RLQ abdominal pain, s/p retrograde enteroscopy 1/11. currently with SBO.    Plan :  CT scan - follow up on final read  NPO/IVF D5 1/2NS@100cc/hr  Pain control (IV tylenol/dilaudid PRN), nausea control  AM labs   OOB/IS 28 yo F with previous abd surgery (ex-lap with SBR 2/2 intussusception and ex-lap with SANDRO 2/2 SBO) p/w acute RLQ abdominal pain, s/p retrograde enteroscopy 1/11. Currently with SBO. Worsening abdominal pain night of 1/11 with PO intake. + guarding. Ct concerning for worsening intussception.      Plan :  OR today - 21 units on hold   NPO/IVF D5 1/2NS@100cc/hr  Pain control (IV tylenol/dilaudid PRN), nausea control  AM labs   OOB/IS

## 2018-01-12 NOTE — PROGRESS NOTE ADULT - ASSESSMENT
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 melena, hematochezia, hematemesis)  - Monitor H/H, transfuse if Hgb <7  - Please obtain EGD and Colonoscopy report from 2016    Case d/w Dr. Shannan TRUJILLO will follow

## 2018-01-12 NOTE — BRIEF OPERATIVE NOTE - POST-OP DX
Cecum mass  01/12/2018    Yvonne Narayan  Ileal intussusception  01/12/2018    Active  Bravo, Netanel

## 2018-01-12 NOTE — BRIEF OPERATIVE NOTE - PROCEDURE
<<-----Click on this checkbox to enter Procedure Ileocecectomy, laparoscopic  01/12/2018    Active  NALPER  Small bowel resection  01/12/2018    Active  NALPER  Diagnostic laparoscopy  01/12/2018    Active  NALPER

## 2018-01-12 NOTE — PROGRESS NOTE ADULT - SUBJECTIVE AND OBJECTIVE BOX
Heme/Onc Progress Note (Dr. Lee)       Interval History: Interval worsening of abdominal pain, with nausea and vomiting. Patient is s/p NGT insertion. Repeat imaging with progression of edema/ intussusception and SBO. Patient denies acute cp, shortness of breath, fevers, chills, bleeding or bruising including BRBPR, melena or hematuria.      ROS is otherwise negative.      Allergies    No Known Allergies    Intolerances        Medications:  MEDICATIONS  (STANDING):  acetaminophen  IVPB. 700 milliGRAM(s) IV Intermittent once  benzocaine 15 mG/menthol 3.6 mG Lozenge 1 Lozenge Oral once  dextrose 5% + sodium chloride 0.45%. 1000 milliLiter(s) (100 mL/Hr) IV Continuous <Continuous>  heparin  Injectable 5000 Unit(s) SubCutaneous every 12 hours  iron sucrose IVPB 200 milliGRAM(s) IV Intermittent every 24 hours  metoclopramide Injectable 10 milliGRAM(s) IV Push once    MEDICATIONS  (PRN):  ondansetron Injectable 4 milliGRAM(s) IV Push every 6 hours PRN Nausea    heparin  Injectable 5000 Unit(s) SubCutaneous every 12 hours          PHYSICAL EXAM:    T(F): 99.2 (18 @ 14:33), Max: 99.2 (18 @ 14:33)  HR: 85 (18 @ 14:33) (69 - 85)  BP: 107/71 (18 @ 14:33) (99/63 - 111/73)  RR: 16 (18 @ 14:33) (16 - 17)  SpO2: 99% (18 @ 14:33) (98% - 100%)  Wt(kg): --    Daily     Daily Weight in k.8 (2018 15:17)    GEN: moderate distress, NGT placed  HEENT: AT/NC, anicteric sclera, no oral lesions  NECK: no jvd, no lymphadenopathy   CVS: +S1S2 RRR no mrg  LUNG: CTA BL  ABD: tenderness to palpation w/guarding  : no cva tenderness  EXT: no c/c/e  NEURO: aaox3, non focal          Labs:                          7.8    8.0   )-----------( 234      ( 2018 20:53 )             26.7     CBC Full  -  ( 2018 20:53 )  WBC Count : 8.0 K/uL  Hemoglobin : 7.8 g/dL  Hematocrit : 26.7 %  Platelet Count - Automated : 234 K/uL  Mean Cell Volume : 65.8 fL  Mean Cell Hemoglobin : 19.2 pg  Mean Cell Hemoglobin Concentration : 29.2 g/dL  Auto Neutrophil # : x  Auto Lymphocyte # : x  Auto Monocyte # : x  Auto Eosinophil # : x  Auto Basophil # : x  Auto Neutrophil % : x  Auto Lymphocyte % : x  Auto Monocyte % : x  Auto Eosinophil % : x  Auto Basophil % : x    PT/INR - ( 2018 20:52 )   PT: 11.4 sec;   INR: 1.03          PTT - ( 2018 20:52 )  PTT:53.6 sec        139  |  102  |  5<L>  ----------------------------<  123<H>  3.6   |  24  |  0.76    Ca    8.9      2018 20:53  Phos  1.5       Mg     2.3         TPro  7.4  /  Alb  3.9  /  TBili  0.4  /  DBili  x   /  AST  25  /  ALT  11  /  AlkPhos  62      CT/A/P - IMPRESSION:  Interval progression of the enteroenteric intussusception with   significant wall thickening and edema.  2.5 cm intraluminal mass is probably the lead point..  Several small bowel loops dilated up to 4 cm suggesting some degree of   obstruction..  Interval development of tiny pleural effusions and small amount of   ascites.  Minor findings described above.      Final Diagnosis  Ileum, polypectomy:  - Polypoid fragments of ileal mucosa with marked submucosal  lymphoid aggregate    X-ray  FINDINGS:  Supine and upright views of the abdomen are submitted. Chain suture   material projects over the right mid abdomen. Few dilated small bowel   loops are again seen. Air-fluid levels seen on the upright view may   suggest small bowel obstruction . No  intraperitoneal free air.   Visualized lung bases clear. Stomach not distended.      IMPRESSION:  Dilated small bowel loops with differential air-fluid levels. Dilated   bowel loops were seen on prior CT. Findings suggest small bowel   obstruction..

## 2018-01-12 NOTE — BRIEF OPERATIVE NOTE - OPERATION/FINDINGS
Ileal intussusception noted on diagnostic laparoscopy, exteriorized and resected with primary stapled anastomosis. Heterogeneous cecal mass noted. Ileocolic resection performed with primary stapled anastomosis. Anastomosis dopplered with audible signal and bleeding edges noted. Large polypoid mass found inside resected segment of ileum.

## 2018-01-13 LAB
ANION GAP SERPL CALC-SCNC: 13 MMOL/L — SIGNIFICANT CHANGE UP (ref 5–17)
APTT BLD: 30.8 SEC — SIGNIFICANT CHANGE UP (ref 27.5–37.4)
BUN SERPL-MCNC: 4 MG/DL — LOW (ref 7–23)
CALCIUM SERPL-MCNC: 8.6 MG/DL — SIGNIFICANT CHANGE UP (ref 8.4–10.5)
CHLORIDE SERPL-SCNC: 103 MMOL/L — SIGNIFICANT CHANGE UP (ref 96–108)
CO2 SERPL-SCNC: 24 MMOL/L — SIGNIFICANT CHANGE UP (ref 22–31)
CREAT SERPL-MCNC: 0.72 MG/DL — SIGNIFICANT CHANGE UP (ref 0.5–1.3)
GLUCOSE SERPL-MCNC: 181 MG/DL — HIGH (ref 70–99)
HCT VFR BLD CALC: 23.8 % — LOW (ref 34.5–45)
HGB BLD-MCNC: 6.7 G/DL — CRITICAL LOW (ref 11.5–15.5)
MAGNESIUM SERPL-MCNC: 1.4 MG/DL — LOW (ref 1.6–2.6)
MCHC RBC-ENTMCNC: 19 PG — LOW (ref 27–34)
MCHC RBC-ENTMCNC: 28.2 G/DL — LOW (ref 32–36)
MCV RBC AUTO: 67.4 FL — LOW (ref 80–100)
PHOSPHATE SERPL-MCNC: 3.4 MG/DL — SIGNIFICANT CHANGE UP (ref 2.5–4.5)
PLATELET # BLD AUTO: 337 K/UL — SIGNIFICANT CHANGE UP (ref 150–400)
POTASSIUM SERPL-MCNC: 3.7 MMOL/L — SIGNIFICANT CHANGE UP (ref 3.5–5.3)
POTASSIUM SERPL-SCNC: 3.7 MMOL/L — SIGNIFICANT CHANGE UP (ref 3.5–5.3)
RBC # BLD: 3.53 M/UL — LOW (ref 3.8–5.2)
RBC # FLD: 24.8 % — HIGH (ref 10.3–16.9)
SODIUM SERPL-SCNC: 140 MMOL/L — SIGNIFICANT CHANGE UP (ref 135–145)
WBC # BLD: 19.3 K/UL — HIGH (ref 3.8–10.5)
WBC # FLD AUTO: 19.3 K/UL — HIGH (ref 3.8–10.5)

## 2018-01-13 RX ORDER — ACETAMINOPHEN 500 MG
1000 TABLET ORAL ONCE
Qty: 0 | Refills: 0 | Status: COMPLETED | OUTPATIENT
Start: 2018-01-13 | End: 2018-01-13

## 2018-01-13 RX ORDER — POTASSIUM CHLORIDE 20 MEQ
10 PACKET (EA) ORAL
Qty: 0 | Refills: 0 | Status: COMPLETED | OUTPATIENT
Start: 2018-01-13 | End: 2018-01-13

## 2018-01-13 RX ORDER — HYDROMORPHONE HYDROCHLORIDE 2 MG/ML
0.5 INJECTION INTRAMUSCULAR; INTRAVENOUS; SUBCUTANEOUS ONCE
Qty: 0 | Refills: 0 | Status: DISCONTINUED | OUTPATIENT
Start: 2018-01-13 | End: 2018-01-16

## 2018-01-13 RX ORDER — KETOROLAC TROMETHAMINE 30 MG/ML
15 SYRINGE (ML) INJECTION EVERY 6 HOURS
Qty: 0 | Refills: 0 | Status: DISCONTINUED | OUTPATIENT
Start: 2018-01-13 | End: 2018-01-16

## 2018-01-13 RX ORDER — MAGNESIUM SULFATE 500 MG/ML
2 VIAL (ML) INJECTION
Qty: 0 | Refills: 0 | Status: COMPLETED | OUTPATIENT
Start: 2018-01-13 | End: 2018-01-13

## 2018-01-13 RX ADMIN — Medication 15 MILLIGRAM(S): at 21:11

## 2018-01-13 RX ADMIN — Medication 100 MILLIEQUIVALENT(S): at 15:30

## 2018-01-13 RX ADMIN — Medication 15 MILLIGRAM(S): at 16:13

## 2018-01-13 RX ADMIN — Medication 400 MILLIGRAM(S): at 12:09

## 2018-01-13 RX ADMIN — Medication 15 MILLIGRAM(S): at 21:30

## 2018-01-13 RX ADMIN — IRON SUCROSE 110 MILLIGRAM(S): 20 INJECTION, SOLUTION INTRAVENOUS at 14:23

## 2018-01-13 RX ADMIN — Medication 50 GRAM(S): at 11:27

## 2018-01-13 RX ADMIN — Medication 400 MILLIGRAM(S): at 05:26

## 2018-01-13 RX ADMIN — Medication 15 MILLIGRAM(S): at 16:43

## 2018-01-13 RX ADMIN — HEPARIN SODIUM 5000 UNIT(S): 5000 INJECTION INTRAVENOUS; SUBCUTANEOUS at 05:04

## 2018-01-13 RX ADMIN — Medication 50 GRAM(S): at 12:56

## 2018-01-13 RX ADMIN — Medication 15 MILLIGRAM(S): at 09:00

## 2018-01-13 RX ADMIN — Medication 1000 MILLIGRAM(S): at 12:39

## 2018-01-13 RX ADMIN — Medication 1000 MILLIGRAM(S): at 06:15

## 2018-01-13 RX ADMIN — Medication 15 MILLIGRAM(S): at 09:27

## 2018-01-13 NOTE — PROGRESS NOTE ADULT - ASSESSMENT
A/P: 27y Female with abdominal pain now s/p Diagnostic laparoscopy, small bowel resection, ileocecectomy  Diet: NPO/IVF  Pain/nausea control  DVT ppx: SCDS/HSQ  Await pathology  Dispo plan: Home

## 2018-01-13 NOTE — PROGRESS NOTE ADULT - SUBJECTIVE AND OBJECTIVE BOX
INTERVAL HPI/OVERNIGHT EVENTS: Went to OR    STATUS POST:  Diagnostic laparoscopy, small bowel resection, ileocecectomy      POST OPERATIVE DAY #: 1    SUBJECTIVE:  Flatus: [ ] YES [ ] NO             Bowel Movement: [ ] YES [ ] NO  Pain (0-10):            Pain Control Adequate: [ ] YES [ ] NO  Nausea: [ ] YES [ ] NO            Vomiting: [ ] YES [ ] NO  Diarrhea: [ ] YES [ ] NO         Constipation: [ ] YES [ ] NO     Chest Pain: [ ] YES [ ] NO    SOB:  [ ] YES [ ] NO    MEDICATIONS  (STANDING):  benzocaine 15 mG/menthol 3.6 mG Lozenge 1 Lozenge Oral once  dextrose 5% + sodium chloride 0.45%. 1000 milliLiter(s) (100 mL/Hr) IV Continuous <Continuous>  heparin  Injectable 5000 Unit(s) SubCutaneous every 12 hours  iron sucrose IVPB 200 milliGRAM(s) IV Intermittent every 24 hours  metoclopramide Injectable 10 milliGRAM(s) IV Push once    MEDICATIONS  (PRN):  acetaminophen  IVPB. 1000 milliGRAM(s) IV Intermittent every 8 hours PRN Severe Pain (7 - 10)  HYDROmorphone  Injectable 0.5 milliGRAM(s) IV Push once PRN Severe Pain (7 - 10)  ondansetron Injectable 4 milliGRAM(s) IV Push every 6 hours PRN Nausea      Vital Signs Last 24 Hrs  T(C): 36.7 (13 Jan 2018 01:24), Max: 37.7 (12 Jan 2018 17:45)  T(F): 98.1 (13 Jan 2018 01:24), Max: 99.9 (12 Jan 2018 17:45)  HR: 84 (13 Jan 2018 01:24) (60 - 92)  BP: 103/66 (13 Jan 2018 01:24) (99/63 - 127/75)  BP(mean): 91 (12 Jan 2018 23:45) (91 - 98)  RR: 16 (13 Jan 2018 01:24) (16 - 20)  SpO2: 98% (13 Jan 2018 01:24) (98% - 100%)    PHYSICAL EXAM:      Constitutional: A&Ox3    Breasts:    Respiratory: non labored breathing, no respiratory distress    Cardiovascular: NSR, RRR    Gastrointestinal:                 Incision:    Genitourinary:    Extremities: (-) edema                  I&O's Detail    11 Jan 2018 07:01  -  12 Jan 2018 07:00  --------------------------------------------------------  IN:    dextrose 5% + sodium chloride 0.45%.: 2500 mL    Oral Fluid: 400 mL  Total IN: 2900 mL    OUT:    Emesis: 450 mL    Voided: 950 mL  Total OUT: 1400 mL    Total NET: 1500 mL      12 Jan 2018 07:01  -  13 Jan 2018 03:20  --------------------------------------------------------  IN:    dextrose 5% + sodium chloride 0.45%.: 600 mL  Total IN: 600 mL    OUT:    Indwelling Catheter - Urethral: 415 mL    Voided: 800 mL  Total OUT: 1215 mL    Total NET: -615 mL          LABS:                        7.8    8.0   )-----------( 234      ( 11 Jan 2018 20:53 )             26.7     01-11    139  |  102  |  5<L>  ----------------------------<  123<H>  3.6   |  24  |  0.76    Ca    8.9      11 Jan 2018 20:53  Phos  1.5     01-11  Mg     2.3     01-11    TPro  7.4  /  Alb  3.9  /  TBili  0.4  /  DBili  x   /  AST  25  /  ALT  11  /  AlkPhos  62  01-11    PT/INR - ( 11 Jan 2018 20:52 )   PT: 11.4 sec;   INR: 1.03          PTT - ( 11 Jan 2018 20:52 )  PTT:53.6 sec      RADIOLOGY & ADDITIONAL STUDIES: SUBJECTIVE: Patient seen and examined bedside by chief resident. o/n dx lap, SBR, ileocecetomy    Vital Signs Last 24 Hrs  T(C): 38 (13 Jan 2018 05:34), Max: 38 (13 Jan 2018 05:34)  T(F): 100.4 (13 Jan 2018 05:34), Max: 100.4 (13 Jan 2018 05:34)  HR: 79 (13 Jan 2018 05:34) (60 - 92)  BP: 105/68 (13 Jan 2018 05:34) (103/66 - 127/75)  BP(mean): 91 (12 Jan 2018 23:45) (91 - 98)  RR: 16 (13 Jan 2018 05:34) (16 - 20)  SpO2: 97% (13 Jan 2018 05:34) (97% - 100%)  I&O's Detail    12 Jan 2018 07:01  -  13 Jan 2018 07:00  --------------------------------------------------------  IN:    dextrose 5% + sodium chloride 0.45%.: 1000 mL  Total IN: 1000 mL    OUT:    Indwelling Catheter - Urethral: 1415 mL    Voided: 800 mL  Total OUT: 2215 mL    Total NET: -1215 mL          General: NAD, resting comfortably in bed  Pulm: Nonlabored breathing, no respiratory distress  Abd: soft distended appropriately tender, incision c/d/i        LABS:                        7.8    8.0   )-----------( 234      ( 11 Jan 2018 20:53 )             26.7     01-11    139  |  102  |  5<L>  ----------------------------<  123<H>  3.6   |  24  |  0.76    Ca    8.9      11 Jan 2018 20:53  Phos  1.5     01-11  Mg     2.3     01-11    TPro  7.4  /  Alb  3.9  /  TBili  0.4  /  DBili  x   /  AST  25  /  ALT  11  /  AlkPhos  62  01-11    PT/INR - ( 11 Jan 2018 20:52 )   PT: 11.4 sec;   INR: 1.03          PTT - ( 11 Jan 2018 20:52 )  PTT:53.6 sec      RADIOLOGY & ADDITIONAL STUDIES:

## 2018-01-13 NOTE — PROGRESS NOTE ADULT - SUBJECTIVE AND OBJECTIVE BOX
Pt seen and examined at bedside. s/p diagnostic lap showing a cecal mass and ileal intussusception s/p ileocecectomy and SBR. Pt states that she may have passed flatus overnight, reports abdominal pain, denies nausea, vomiting, melena, or hematochezia.     Allergies    No Known Allergies    Intolerances      MEDICATIONS:  MEDICATIONS  (STANDING):  benzocaine 15 mG/menthol 3.6 mG Lozenge 1 Lozenge Oral once  dextrose 5% + sodium chloride 0.45%. 1000 milliLiter(s) (100 mL/Hr) IV Continuous <Continuous>  heparin  Injectable 5000 Unit(s) SubCutaneous every 12 hours  iron sucrose IVPB 200 milliGRAM(s) IV Intermittent every 24 hours  ketorolac   Injectable 15 milliGRAM(s) IV Push every 6 hours  metoclopramide Injectable 10 milliGRAM(s) IV Push once  potassium chloride  10 mEq/100 mL IVPB 10 milliEquivalent(s) IV Intermittent every 1 hour    MEDICATIONS  (PRN):  HYDROmorphone  Injectable 0.5 milliGRAM(s) IV Push once PRN Severe Pain (7 - 10)  ondansetron Injectable 4 milliGRAM(s) IV Push every 6 hours PRN Nausea    Vital Signs Last 24 Hrs  T(C): 38.1 (13 Jan 2018 12:57), Max: 38.1 (13 Jan 2018 12:57)  T(F): 100.5 (13 Jan 2018 12:57), Max: 100.5 (13 Jan 2018 12:57)  HR: 97 (13 Jan 2018 12:57) (60 - 97)  BP: 105/70 (13 Jan 2018 12:57) (103/60 - 127/75)  BP(mean): 91 (12 Jan 2018 23:45) (91 - 98)  RR: 17 (13 Jan 2018 12:57) (16 - 20)  SpO2: 97% (13 Jan 2018 12:57) (97% - 100%)    01-12 @ 07:01  -  01-13 @ 07:00  --------------------------------------------------------  IN: 1000 mL / OUT: 2215 mL / NET: -1215 mL    01-13 @ 07:01  -  01-13 @ 13:17  --------------------------------------------------------  IN: 0 mL / OUT: 2000 mL / NET: -2000 mL    PHYSICAL EXAM:    General: Frail; in no acute distress  HEENT: MMM, conjunctiva and sclera clear  Gastrointestinal: Soft diffuse TTP non-distended, incisions CDI  Skin: Warm and dry. No obvious rash    LABS:                        6.7    19.3  )-----------( 337      ( 13 Jan 2018 08:38 )             23.8     01-13    140  |  103  |  4<L>  ----------------------------<  181<H>  3.7   |  24  |  0.72    Ca    8.6      13 Jan 2018 08:38  Phos  3.4     01-13  Mg     1.4     01-13    TPro  7.4  /  Alb  3.9  /  TBili  0.4  /  DBili  x   /  AST  25  /  ALT  11  /  AlkPhos  62  01-11    PT/INR - ( 11 Jan 2018 20:52 )   PT: 11.4 sec;   INR: 1.03       PTT - ( 11 Jan 2018 20:52 )  PTT:53.6 sec    Surgical Pathology Report (01.11.18 @ 12:31)    Surgical Pathology Report:   ACCESSION No:  [id number]    JORGE WOOD                       1        Surgical Final Report          Final Diagnosis  Ileum, polypectomy:  - Polypoid fragments of ileal mucosa with marked submucosal  lymphoid aggregate    Aldair Herrera M.D.  (Electronic Signature)  Reported on: 01/12/18    Clinical History  History of abnormal CT imaging (cecal mass on CT;  intussusception)    Specimen(s) Submitted  1     Polyp ileum    Gross Description  The specimen is received in formalin, labeled with the patient's  identification and "polyp ileum."  It consists of a tan-pink  polyp measuring 1.0 x 0.8 x 0.5 cm and two additional fragments  of tan-pink soft tissue measuring 0.7 x 0.5 x 0.1 cm in  aggregate.  The resection margin is inked black.Sectioning  reveals a tan-pink, homogenous cut surface.  The specimen is  entirely submitted in two cassettes: 1A-polyp; 1B-additional  fragments.   01/11/18 15:51    RADIOLOGY & ADDITIONAL STUDIES: No new radiologic studies

## 2018-01-13 NOTE — PROGRESS NOTE ADULT - SUBJECTIVE AND OBJECTIVE BOX
Procedure: Diagnostic laparoscopy, small bowel resection, ileocecetomy  Surgeon: Kaylin Dumont MD    S: Pt sedated and unable to report any complaints  O:  T(C): 36.7 (01-13-18 @ 01:24), Max: 37.2 (01-12-18 @ 22:40)  T(F): 98.1 (01-13-18 @ 01:24), Max: 98.9 (01-12-18 @ 22:40)  HR: 84 (01-13-18 @ 01:24) (60 - 92)  BP: 103/66 (01-13-18 @ 01:24) (103/66 - 127/75)  RR: 16 (01-13-18 @ 01:24) (16 - 20)  SpO2: 98% (01-13-18 @ 01:24) (98% - 100%)  Wt(kg): --                        7.8    8.0   )-----------( 234      ( 11 Jan 2018 20:53 )             26.7     01-11    139  |  102  |  5<L>  ----------------------------<  123<H>  3.6   |  24  |  0.76    Ca    8.9      11 Jan 2018 20:53  Phos  1.5     01-11  Mg     2.3     01-11    TPro  7.4  /  Alb  3.9  /  TBili  0.4  /  DBili  x   /  AST  25  /  ALT  11  /  AlkPhos  62  01-11      Gen: NAD, resting comfortably in bed, opens eyes to verbal stimuli, moves all extremities  C/V: NSR  Pulm: Nonlabored breathing, no respiratory distress  Abd: Non-distended, soft, appropriately tender, incision C/D/I  Extrem: WWP, no calf edema, SCDs in place

## 2018-01-13 NOTE — PROGRESS NOTE ADULT - ASSESSMENT
27y Female with SBO/intussusception now s/p Diagnostic laparoscopy, small bowel resection, ileocecectomy    Diet: NPO/IVF  Pain/nausea control  DVT ppx: SCDS/HSQ  Await pathology of polypoid mass/cecum  am labs  Dispo plan: Home

## 2018-01-13 NOTE — PROGRESS NOTE ADULT - ASSESSMENT
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 melena, hematochezia, hematemesis)  - Monitor H/H, transfuse if Hgb <7  - Please obtain EGD and Colonoscopy report from 2016    Case d/w Dr. Campbell  GI will sign off at this time, please reconsult if further questions

## 2018-01-14 LAB
ANION GAP SERPL CALC-SCNC: 12 MMOL/L — SIGNIFICANT CHANGE UP (ref 5–17)
BUN SERPL-MCNC: 4 MG/DL — LOW (ref 7–23)
CALCIUM SERPL-MCNC: 8.6 MG/DL — SIGNIFICANT CHANGE UP (ref 8.4–10.5)
CHLORIDE SERPL-SCNC: 104 MMOL/L — SIGNIFICANT CHANGE UP (ref 96–108)
CO2 SERPL-SCNC: 25 MMOL/L — SIGNIFICANT CHANGE UP (ref 22–31)
CREAT SERPL-MCNC: 0.76 MG/DL — SIGNIFICANT CHANGE UP (ref 0.5–1.3)
GLUCOSE SERPL-MCNC: 99 MG/DL — SIGNIFICANT CHANGE UP (ref 70–99)
HCT VFR BLD CALC: 29 % — LOW (ref 34.5–45)
HGB BLD-MCNC: 8.6 G/DL — LOW (ref 11.5–15.5)
MAGNESIUM SERPL-MCNC: 2.1 MG/DL — SIGNIFICANT CHANGE UP (ref 1.6–2.6)
MCHC RBC-ENTMCNC: 21.7 PG — LOW (ref 27–34)
MCHC RBC-ENTMCNC: 29.7 G/DL — LOW (ref 32–36)
MCV RBC AUTO: 73.2 FL — LOW (ref 80–100)
PHOSPHATE SERPL-MCNC: 2.2 MG/DL — LOW (ref 2.5–4.5)
PLATELET # BLD AUTO: 431 K/UL — HIGH (ref 150–400)
POTASSIUM SERPL-MCNC: 3.7 MMOL/L — SIGNIFICANT CHANGE UP (ref 3.5–5.3)
POTASSIUM SERPL-SCNC: 3.7 MMOL/L — SIGNIFICANT CHANGE UP (ref 3.5–5.3)
RBC # BLD: 3.96 M/UL — SIGNIFICANT CHANGE UP (ref 3.8–5.2)
RBC # FLD: 28.1 % — HIGH (ref 10.3–16.9)
SODIUM SERPL-SCNC: 141 MMOL/L — SIGNIFICANT CHANGE UP (ref 135–145)
WBC # BLD: 15.4 K/UL — HIGH (ref 3.8–10.5)
WBC # FLD AUTO: 15.4 K/UL — HIGH (ref 3.8–10.5)

## 2018-01-14 RX ORDER — ACETAMINOPHEN 500 MG
700 TABLET ORAL ONCE
Qty: 0 | Refills: 0 | Status: COMPLETED | OUTPATIENT
Start: 2018-01-14 | End: 2018-01-14

## 2018-01-14 RX ORDER — ACETAMINOPHEN 500 MG
750 TABLET ORAL ONCE
Qty: 0 | Refills: 0 | Status: COMPLETED | OUTPATIENT
Start: 2018-01-14 | End: 2018-01-14

## 2018-01-14 RX ORDER — POTASSIUM PHOSPHATE, MONOBASIC POTASSIUM PHOSPHATE, DIBASIC 236; 224 MG/ML; MG/ML
15 INJECTION, SOLUTION INTRAVENOUS ONCE
Qty: 0 | Refills: 0 | Status: COMPLETED | OUTPATIENT
Start: 2018-01-14 | End: 2018-01-14

## 2018-01-14 RX ORDER — ACETAMINOPHEN 500 MG
1000 TABLET ORAL ONCE
Qty: 0 | Refills: 0 | Status: DISCONTINUED | OUTPATIENT
Start: 2018-01-14 | End: 2018-01-14

## 2018-01-14 RX ORDER — MAGNESIUM SULFATE 500 MG/ML
2 VIAL (ML) INJECTION ONCE
Qty: 0 | Refills: 0 | Status: COMPLETED | OUTPATIENT
Start: 2018-01-14 | End: 2018-01-14

## 2018-01-14 RX ADMIN — Medication 15 MILLIGRAM(S): at 12:08

## 2018-01-14 RX ADMIN — Medication 15 MILLIGRAM(S): at 04:54

## 2018-01-14 RX ADMIN — SODIUM CHLORIDE 100 MILLILITER(S): 9 INJECTION, SOLUTION INTRAVENOUS at 14:24

## 2018-01-14 RX ADMIN — HEPARIN SODIUM 5000 UNIT(S): 5000 INJECTION INTRAVENOUS; SUBCUTANEOUS at 05:04

## 2018-01-14 RX ADMIN — Medication 15 MILLIGRAM(S): at 04:29

## 2018-01-14 RX ADMIN — Medication 700 MILLIGRAM(S): at 23:09

## 2018-01-14 RX ADMIN — POTASSIUM PHOSPHATE, MONOBASIC POTASSIUM PHOSPHATE, DIBASIC 62.5 MILLIMOLE(S): 236; 224 INJECTION, SOLUTION INTRAVENOUS at 14:24

## 2018-01-14 RX ADMIN — SODIUM CHLORIDE 100 MILLILITER(S): 9 INJECTION, SOLUTION INTRAVENOUS at 04:29

## 2018-01-14 RX ADMIN — Medication 750 MILLIGRAM(S): at 02:04

## 2018-01-14 RX ADMIN — Medication 280 MILLIGRAM(S): at 22:51

## 2018-01-14 RX ADMIN — Medication 15 MILLIGRAM(S): at 10:34

## 2018-01-14 RX ADMIN — Medication 15 MILLIGRAM(S): at 21:57

## 2018-01-14 RX ADMIN — Medication 300 MILLIGRAM(S): at 01:29

## 2018-01-14 RX ADMIN — Medication 15 MILLIGRAM(S): at 16:17

## 2018-01-14 RX ADMIN — Medication 15 MILLIGRAM(S): at 15:08

## 2018-01-14 RX ADMIN — Medication 50 GRAM(S): at 08:17

## 2018-01-14 RX ADMIN — Medication 15 MILLIGRAM(S): at 21:47

## 2018-01-14 RX ADMIN — IRON SUCROSE 110 MILLIGRAM(S): 20 INJECTION, SOLUTION INTRAVENOUS at 15:08

## 2018-01-14 NOTE — PROGRESS NOTE ADULT - SUBJECTIVE AND OBJECTIVE BOX
o/n: OOBA, IS, tmax 100.2, MARY  1/13: Hb 6.7, presyncopal episode, tmax 100.7, 1U PRBC o/n: OOBA, IS, tmax 100.2, MARY  1/13: Hb 6.7, presyncopal episode, tmax 100.7, 1U PRBC    STATUS POST:  S/p ex-lap, SBR, ileocecectomy. POD 2     SUBJECTIVE: Patient seen and examined bedside by chief resident. Feels less distended. Having BMs    heparin  Injectable 5000 Unit(s) SubCutaneous every 12 hours      Vital Signs Last 24 Hrs  T(C): 37.3 (14 Jan 2018 05:05), Max: 38.1 (13 Jan 2018 12:57)  T(F): 99.2 (14 Jan 2018 05:05), Max: 100.5 (13 Jan 2018 12:57)  HR: 85 (14 Jan 2018 05:05) (74 - 98)  BP: 108/72 (14 Jan 2018 05:05) (103/60 - 112/73)  BP(mean): --  RR: 16 (14 Jan 2018 05:05) (16 - 18)  SpO2: 100% (14 Jan 2018 05:05) (97% - 100%)  I&O's Detail    13 Jan 2018 07:01  -  14 Jan 2018 07:00  --------------------------------------------------------  IN:    dextrose 5% + sodium chloride 0.45%.: 2100 mL    Packed Red Blood Cells: 350 mL  Total IN: 2450 mL    OUT:    Indwelling Catheter - Urethral: 2500 mL    Voided: 1100 mL  Total OUT: 3600 mL    Total NET: -1150 mL          General: NAD, resting comfortably in bed  C/V: NSR  Pulm: Nonlabored breathing, no respiratory distress  Abd: soft, NT/ND. Incisions CDI  Extrem: WWP, no edema, SCDs in place        LABS:                        6.7    19.3  )-----------( 337      ( 13 Jan 2018 08:38 )             23.8     01-13    140  |  103  |  4<L>  ----------------------------<  181<H>  3.7   |  24  |  0.72    Ca    8.6      13 Jan 2018 08:38  Phos  3.4     01-13  Mg     1.4     01-13      PTT - ( 13 Jan 2018 20:27 )  PTT:30.8 sec      RADIOLOGY & ADDITIONAL STUDIES:

## 2018-01-15 LAB
ANION GAP SERPL CALC-SCNC: 10 MMOL/L — SIGNIFICANT CHANGE UP (ref 5–17)
APPEARANCE UR: CLEAR — SIGNIFICANT CHANGE UP
BILIRUB UR-MCNC: (no result)
BUN SERPL-MCNC: 5 MG/DL — LOW (ref 7–23)
CALCIUM SERPL-MCNC: 8.3 MG/DL — LOW (ref 8.4–10.5)
CHLORIDE SERPL-SCNC: 104 MMOL/L — SIGNIFICANT CHANGE UP (ref 96–108)
CO2 SERPL-SCNC: 26 MMOL/L — SIGNIFICANT CHANGE UP (ref 22–31)
COLOR SPEC: YELLOW — SIGNIFICANT CHANGE UP
CREAT SERPL-MCNC: 0.62 MG/DL — SIGNIFICANT CHANGE UP (ref 0.5–1.3)
DIFF PNL FLD: (no result)
GLUCOSE SERPL-MCNC: 118 MG/DL — HIGH (ref 70–99)
GLUCOSE UR QL: NEGATIVE — SIGNIFICANT CHANGE UP
HCT VFR BLD CALC: 25.6 % — LOW (ref 34.5–45)
HCT VFR BLD CALC: 29 % — LOW (ref 34.5–45)
HGB BLD-MCNC: 7.6 G/DL — LOW (ref 11.5–15.5)
HGB BLD-MCNC: 8.5 G/DL — LOW (ref 11.5–15.5)
KETONES UR-MCNC: 15 MG/DL
LEUKOCYTE ESTERASE UR-ACNC: NEGATIVE — SIGNIFICANT CHANGE UP
MAGNESIUM SERPL-MCNC: 1.8 MG/DL — SIGNIFICANT CHANGE UP (ref 1.6–2.6)
MCHC RBC-ENTMCNC: 21.7 PG — LOW (ref 27–34)
MCHC RBC-ENTMCNC: 22.3 PG — LOW (ref 27–34)
MCHC RBC-ENTMCNC: 29.3 G/DL — LOW (ref 32–36)
MCHC RBC-ENTMCNC: 29.7 G/DL — LOW (ref 32–36)
MCV RBC AUTO: 74.2 FL — LOW (ref 80–100)
MCV RBC AUTO: 75.1 FL — LOW (ref 80–100)
NITRITE UR-MCNC: NEGATIVE — SIGNIFICANT CHANGE UP
PH UR: 6 — SIGNIFICANT CHANGE UP (ref 5–8)
PHOSPHATE SERPL-MCNC: 1.6 MG/DL — LOW (ref 2.5–4.5)
PLATELET # BLD AUTO: 491 K/UL — HIGH (ref 150–400)
PLATELET # BLD AUTO: 632 K/UL — HIGH (ref 150–400)
POTASSIUM SERPL-MCNC: 3.8 MMOL/L — SIGNIFICANT CHANGE UP (ref 3.5–5.3)
POTASSIUM SERPL-SCNC: 3.8 MMOL/L — SIGNIFICANT CHANGE UP (ref 3.5–5.3)
PROT UR-MCNC: (no result) MG/DL
RBC # BLD: 3.41 M/UL — LOW (ref 3.8–5.2)
RBC # BLD: 3.91 M/UL — SIGNIFICANT CHANGE UP (ref 3.8–5.2)
RBC # FLD: 28.8 % — HIGH (ref 10.3–16.9)
RBC # FLD: 29.7 % — HIGH (ref 10.3–16.9)
SODIUM SERPL-SCNC: 140 MMOL/L — SIGNIFICANT CHANGE UP (ref 135–145)
SP GR SPEC: 1.02 — SIGNIFICANT CHANGE UP (ref 1–1.03)
UROBILINOGEN FLD QL: 2 E.U./DL
WBC # BLD: 12.6 K/UL — HIGH (ref 3.8–10.5)
WBC # BLD: 14.1 K/UL — HIGH (ref 3.8–10.5)
WBC # FLD AUTO: 12.6 K/UL — HIGH (ref 3.8–10.5)
WBC # FLD AUTO: 14.1 K/UL — HIGH (ref 3.8–10.5)

## 2018-01-15 PROCEDURE — 71045 X-RAY EXAM CHEST 1 VIEW: CPT | Mod: 26

## 2018-01-15 RX ORDER — POTASSIUM PHOSPHATE, MONOBASIC POTASSIUM PHOSPHATE, DIBASIC 236; 224 MG/ML; MG/ML
21 INJECTION, SOLUTION INTRAVENOUS ONCE
Qty: 0 | Refills: 0 | Status: COMPLETED | OUTPATIENT
Start: 2018-01-15 | End: 2018-01-15

## 2018-01-15 RX ORDER — ACETAMINOPHEN 500 MG
700 TABLET ORAL ONCE
Qty: 0 | Refills: 0 | Status: COMPLETED | OUTPATIENT
Start: 2018-01-15 | End: 2018-01-15

## 2018-01-15 RX ORDER — IRON SUCROSE 20 MG/ML
200 INJECTION, SOLUTION INTRAVENOUS EVERY 24 HOURS
Qty: 0 | Refills: 0 | Status: DISCONTINUED | OUTPATIENT
Start: 2018-01-15 | End: 2018-01-16

## 2018-01-15 RX ORDER — FAMOTIDINE 10 MG/ML
20 INJECTION INTRAVENOUS
Qty: 0 | Refills: 0 | Status: DISCONTINUED | OUTPATIENT
Start: 2018-01-15 | End: 2018-01-16

## 2018-01-15 RX ORDER — POTASSIUM CHLORIDE 20 MEQ
40 PACKET (EA) ORAL ONCE
Qty: 0 | Refills: 0 | Status: COMPLETED | OUTPATIENT
Start: 2018-01-15 | End: 2018-01-15

## 2018-01-15 RX ADMIN — Medication 15 MILLIGRAM(S): at 16:09

## 2018-01-15 RX ADMIN — Medication 15 MILLIGRAM(S): at 05:52

## 2018-01-15 RX ADMIN — Medication 15 MILLIGRAM(S): at 10:53

## 2018-01-15 RX ADMIN — POTASSIUM PHOSPHATE, MONOBASIC POTASSIUM PHOSPHATE, DIBASIC 84.5 MILLIMOLE(S): 236; 224 INJECTION, SOLUTION INTRAVENOUS at 10:53

## 2018-01-15 RX ADMIN — HEPARIN SODIUM 5000 UNIT(S): 5000 INJECTION INTRAVENOUS; SUBCUTANEOUS at 05:13

## 2018-01-15 RX ADMIN — Medication 280 MILLIGRAM(S): at 05:51

## 2018-01-15 RX ADMIN — FAMOTIDINE 20 MILLIGRAM(S): 10 INJECTION INTRAVENOUS at 05:12

## 2018-01-15 RX ADMIN — Medication 15 MILLIGRAM(S): at 16:30

## 2018-01-15 RX ADMIN — Medication 15 MILLIGRAM(S): at 22:38

## 2018-01-15 RX ADMIN — FAMOTIDINE 20 MILLIGRAM(S): 10 INJECTION INTRAVENOUS at 16:09

## 2018-01-15 RX ADMIN — Medication 280 MILLIGRAM(S): at 22:37

## 2018-01-15 RX ADMIN — IRON SUCROSE 110 MILLIGRAM(S): 20 INJECTION, SOLUTION INTRAVENOUS at 17:03

## 2018-01-15 RX ADMIN — Medication 15 MILLIGRAM(S): at 05:13

## 2018-01-15 RX ADMIN — Medication 700 MILLIGRAM(S): at 06:06

## 2018-01-15 RX ADMIN — HEPARIN SODIUM 5000 UNIT(S): 5000 INJECTION INTRAVENOUS; SUBCUTANEOUS at 16:39

## 2018-01-15 RX ADMIN — SODIUM CHLORIDE 75 MILLILITER(S): 9 INJECTION, SOLUTION INTRAVENOUS at 17:04

## 2018-01-15 RX ADMIN — Medication 15 MILLIGRAM(S): at 22:59

## 2018-01-15 NOTE — PROGRESS NOTE ADULT - ASSESSMENT
27y Female with SBO/intussusception now s/p Diagnostic laparoscopy, small bowel resection, ileocecectomy 1/12    Diet : CLD/IVF  Pain/nausea control - IV tylenol and dilaudid PRN  DVT ppx: SCDS/HSQ  F/up path report  AM labs (total pRBC transfusion : 1Unit)  Iron sucrose injection (1/10-1/15)   Dispo plan: Home with iron supplement

## 2018-01-15 NOTE — PROGRESS NOTE ADULT - SUBJECTIVE AND OBJECTIVE BOX
ON : Minimal PO intake. VSS, abd pain improves  1/14: Hgb this AM stable at 8.6, Adv to CLD. Improved pain. Having BM    Status : dx lap with SBR and ileocecectomy 1/12 ON : Minimal PO intake. VSS, abd pain improves  1/14: Hgb this AM stable at 8.6, Adv to CLD. Improved pain. Having BM    Status : dx lap with SBR and ileocecectomy 1/12      SUBJECTIVE: Denies any complaints  Flatus: [x ] YES [ ] NO             Bowel Movement: [x ] YES [ ] NO  Pain (0-10):            Pain Control Adequate: [x ] YES [ ] NO  Nausea: [ ] YES [x ] NO            Vomiting: [ ] YES [x ] NO  Diarrhea: [ ] YES [x ] NO         Constipation: [ ] YES [x ] NO     Chest Pain: [ ] YES [x ] NO    SOB:  [ ] YES [x ] NO    MEDICATIONS  (STANDING):  dextrose 5% + sodium chloride 0.45%. 1000 milliLiter(s) (100 mL/Hr) IV Continuous <Continuous>  famotidine    Tablet 20 milliGRAM(s) Oral two times a day  heparin  Injectable 5000 Unit(s) SubCutaneous every 12 hours  ketorolac   Injectable 15 milliGRAM(s) IV Push every 6 hours  metoclopramide Injectable 10 milliGRAM(s) IV Push once    MEDICATIONS  (PRN):  HYDROmorphone  Injectable 0.5 milliGRAM(s) IV Push once PRN Severe Pain (7 - 10)  ondansetron Injectable 4 milliGRAM(s) IV Push every 6 hours PRN Nausea      Vital Signs Last 24 Hrs  T(C): 36.5 (15 Shaun 2018 05:29), Max: 37.8 (14 Jan 2018 14:25)  T(F): 97.7 (15 Shaun 2018 05:29), Max: 100 (14 Jan 2018 14:25)  HR: 105 (15 Shaun 2018 05:29) (94 - 105)  BP: 116/74 (15 Shaun 2018 05:29) (102/68 - 116/74)  BP(mean): --  RR: 16 (15 Shaun 2018 05:29) (16 - 16)  SpO2: 100% (15 Shaun 2018 05:29) (97% - 100%)    PHYSICAL EXAM:      Constitutional: A&Ox3    Respiratory: non labored breathing, no respiratory distress    Cardiovascular: NSR, RRR    Gastrointestinal: Soft, ND,NT                 Incision: CDI    Genitourinary: voiding    Extremities: (-) edema                  I&O's Detail    14 Jan 2018 07:01  -  15 Shaun 2018 07:00  --------------------------------------------------------  IN:    dextrose 5% + sodium chloride 0.45%.: 1100 mL    Oral Fluid: 120 mL  Total IN: 1220 mL    OUT:    Voided: 800 mL  Total OUT: 800 mL    Total NET: 420 mL          LABS:                        7.6    12.6  )-----------( 491      ( 15 Shaun 2018 06:31 )             25.6     01-15    140  |  104  |  5<L>  ----------------------------<  118<H>  3.8   |  26  |  0.62    Ca    8.3<L>      15 Shaun 2018 06:31  Phos  1.6     01-15  Mg     1.8     01-15      PTT - ( 13 Jan 2018 20:27 )  PTT:30.8 sec      RADIOLOGY & ADDITIONAL STUDIES:

## 2018-01-16 LAB
ANION GAP SERPL CALC-SCNC: 11 MMOL/L — SIGNIFICANT CHANGE UP (ref 5–17)
ANION GAP SERPL CALC-SCNC: 11 MMOL/L — SIGNIFICANT CHANGE UP (ref 5–17)
BLD GP AB SCN SERPL QL: NEGATIVE — SIGNIFICANT CHANGE UP
BUN SERPL-MCNC: 5 MG/DL — LOW (ref 7–23)
BUN SERPL-MCNC: 6 MG/DL — LOW (ref 7–23)
C DIFF GDH STL QL: NEGATIVE — SIGNIFICANT CHANGE UP
C DIFF GDH STL QL: SIGNIFICANT CHANGE UP
CALCIUM SERPL-MCNC: 8.3 MG/DL — LOW (ref 8.4–10.5)
CALCIUM SERPL-MCNC: 8.8 MG/DL — SIGNIFICANT CHANGE UP (ref 8.4–10.5)
CHLORIDE SERPL-SCNC: 102 MMOL/L — SIGNIFICANT CHANGE UP (ref 96–108)
CHLORIDE SERPL-SCNC: 99 MMOL/L — SIGNIFICANT CHANGE UP (ref 96–108)
CO2 SERPL-SCNC: 24 MMOL/L — SIGNIFICANT CHANGE UP (ref 22–31)
CO2 SERPL-SCNC: 24 MMOL/L — SIGNIFICANT CHANGE UP (ref 22–31)
CREAT SERPL-MCNC: 0.63 MG/DL — SIGNIFICANT CHANGE UP (ref 0.5–1.3)
CREAT SERPL-MCNC: 0.7 MG/DL — SIGNIFICANT CHANGE UP (ref 0.5–1.3)
GLUCOSE SERPL-MCNC: 115 MG/DL — HIGH (ref 70–99)
GLUCOSE SERPL-MCNC: 95 MG/DL — SIGNIFICANT CHANGE UP (ref 70–99)
HCT VFR BLD CALC: 25.8 % — LOW (ref 34.5–45)
HCT VFR BLD CALC: 27.2 % — LOW (ref 34.5–45)
HGB BLD-MCNC: 7.6 G/DL — LOW (ref 11.5–15.5)
HGB BLD-MCNC: 8 G/DL — LOW (ref 11.5–15.5)
MAGNESIUM SERPL-MCNC: 1.8 MG/DL — SIGNIFICANT CHANGE UP (ref 1.6–2.6)
MCHC RBC-ENTMCNC: 21.9 PG — LOW (ref 27–34)
MCHC RBC-ENTMCNC: 22 PG — LOW (ref 27–34)
MCHC RBC-ENTMCNC: 29.4 G/DL — LOW (ref 32–36)
MCHC RBC-ENTMCNC: 29.5 G/DL — LOW (ref 32–36)
MCV RBC AUTO: 74.5 FL — LOW (ref 80–100)
MCV RBC AUTO: 74.6 FL — LOW (ref 80–100)
PHOSPHATE SERPL-MCNC: 2 MG/DL — LOW (ref 2.5–4.5)
PLATELET # BLD AUTO: 615 K/UL — HIGH (ref 150–400)
PLATELET # BLD AUTO: 619 K/UL — HIGH (ref 150–400)
POTASSIUM SERPL-MCNC: 3.9 MMOL/L — SIGNIFICANT CHANGE UP (ref 3.5–5.3)
POTASSIUM SERPL-MCNC: 4.3 MMOL/L — SIGNIFICANT CHANGE UP (ref 3.5–5.3)
POTASSIUM SERPL-SCNC: 3.9 MMOL/L — SIGNIFICANT CHANGE UP (ref 3.5–5.3)
POTASSIUM SERPL-SCNC: 4.3 MMOL/L — SIGNIFICANT CHANGE UP (ref 3.5–5.3)
RBC # BLD: 3.46 M/UL — LOW (ref 3.8–5.2)
RBC # BLD: 3.65 M/UL — LOW (ref 3.8–5.2)
RBC # FLD: 30.3 % — HIGH (ref 10.3–16.9)
RBC # FLD: 30.5 % — HIGH (ref 10.3–16.9)
RH IG SCN BLD-IMP: POSITIVE — SIGNIFICANT CHANGE UP
SODIUM SERPL-SCNC: 134 MMOL/L — LOW (ref 135–145)
SODIUM SERPL-SCNC: 137 MMOL/L — SIGNIFICANT CHANGE UP (ref 135–145)
WBC # BLD: 12.2 K/UL — HIGH (ref 3.8–10.5)
WBC # BLD: 13.2 K/UL — HIGH (ref 3.8–10.5)
WBC # FLD AUTO: 12.2 K/UL — HIGH (ref 3.8–10.5)
WBC # FLD AUTO: 13.2 K/UL — HIGH (ref 3.8–10.5)

## 2018-01-16 PROCEDURE — 74177 CT ABD & PELVIS W/CONTRAST: CPT | Mod: 26

## 2018-01-16 RX ORDER — PIPERACILLIN AND TAZOBACTAM 4; .5 G/20ML; G/20ML
INJECTION, POWDER, LYOPHILIZED, FOR SOLUTION INTRAVENOUS
Qty: 0 | Refills: 0 | Status: DISCONTINUED | OUTPATIENT
Start: 2018-01-16 | End: 2018-01-16

## 2018-01-16 RX ORDER — SODIUM,POTASSIUM PHOSPHATES 278-250MG
1 POWDER IN PACKET (EA) ORAL
Qty: 0 | Refills: 0 | Status: COMPLETED | OUTPATIENT
Start: 2018-01-16 | End: 2018-01-16

## 2018-01-16 RX ORDER — SODIUM CHLORIDE 9 MG/ML
1000 INJECTION, SOLUTION INTRAVENOUS
Qty: 0 | Refills: 0 | Status: DISCONTINUED | OUTPATIENT
Start: 2018-01-16 | End: 2018-01-20

## 2018-01-16 RX ORDER — ACETAMINOPHEN 500 MG
975 TABLET ORAL ONCE
Qty: 0 | Refills: 0 | Status: COMPLETED | OUTPATIENT
Start: 2018-01-16 | End: 2018-01-16

## 2018-01-16 RX ORDER — MAGNESIUM SULFATE 500 MG/ML
2 VIAL (ML) INJECTION ONCE
Qty: 0 | Refills: 0 | Status: COMPLETED | OUTPATIENT
Start: 2018-01-16 | End: 2018-01-16

## 2018-01-16 RX ORDER — PIPERACILLIN AND TAZOBACTAM 4; .5 G/20ML; G/20ML
3.38 INJECTION, POWDER, LYOPHILIZED, FOR SOLUTION INTRAVENOUS EVERY 6 HOURS
Qty: 0 | Refills: 0 | Status: DISCONTINUED | OUTPATIENT
Start: 2018-01-16 | End: 2018-01-17

## 2018-01-16 RX ORDER — ACETAMINOPHEN 500 MG
700 TABLET ORAL ONCE
Qty: 0 | Refills: 0 | Status: COMPLETED | OUTPATIENT
Start: 2018-01-16 | End: 2018-01-17

## 2018-01-16 RX ORDER — ENOXAPARIN SODIUM 100 MG/ML
50 INJECTION SUBCUTANEOUS EVERY 12 HOURS
Qty: 0 | Refills: 0 | Status: DISCONTINUED | OUTPATIENT
Start: 2018-01-16 | End: 2018-01-23

## 2018-01-16 RX ORDER — PANTOPRAZOLE SODIUM 20 MG/1
40 TABLET, DELAYED RELEASE ORAL DAILY
Qty: 0 | Refills: 0 | Status: DISCONTINUED | OUTPATIENT
Start: 2018-01-16 | End: 2018-01-20

## 2018-01-16 RX ADMIN — FAMOTIDINE 20 MILLIGRAM(S): 10 INJECTION INTRAVENOUS at 05:56

## 2018-01-16 RX ADMIN — Medication 1 TABLET(S): at 14:45

## 2018-01-16 RX ADMIN — Medication 50 GRAM(S): at 09:38

## 2018-01-16 RX ADMIN — PIPERACILLIN AND TAZOBACTAM 200 GRAM(S): 4; .5 INJECTION, POWDER, LYOPHILIZED, FOR SOLUTION INTRAVENOUS at 14:45

## 2018-01-16 RX ADMIN — PANTOPRAZOLE SODIUM 40 MILLIGRAM(S): 20 TABLET, DELAYED RELEASE ORAL at 19:52

## 2018-01-16 RX ADMIN — Medication 1 TABLET(S): at 11:35

## 2018-01-16 RX ADMIN — Medication 975 MILLIGRAM(S): at 13:24

## 2018-01-16 RX ADMIN — Medication 1 TABLET(S): at 09:38

## 2018-01-16 RX ADMIN — PIPERACILLIN AND TAZOBACTAM 200 GRAM(S): 4; .5 INJECTION, POWDER, LYOPHILIZED, FOR SOLUTION INTRAVENOUS at 19:52

## 2018-01-16 RX ADMIN — SODIUM CHLORIDE 75 MILLILITER(S): 9 INJECTION, SOLUTION INTRAVENOUS at 09:17

## 2018-01-16 RX ADMIN — HEPARIN SODIUM 5000 UNIT(S): 5000 INJECTION INTRAVENOUS; SUBCUTANEOUS at 18:50

## 2018-01-16 RX ADMIN — Medication 15 MILLIGRAM(S): at 05:56

## 2018-01-16 RX ADMIN — HEPARIN SODIUM 5000 UNIT(S): 5000 INJECTION INTRAVENOUS; SUBCUTANEOUS at 05:56

## 2018-01-16 NOTE — PROGRESS NOTE ADULT - SUBJECTIVE AND OBJECTIVE BOX
ON : Spiked fever to 101.7, fever work up done, UA neg. CXR clear. WBC : 14.  1/15: IV Fe reordered, adv to reg diet    Surgery:  1/12: Diagnositic lap, SBR with ileocecectomy. ON : Spiked fever to 101.7, fever work up done, UA neg. CXR clear. WBC : 14.  1/15: IV Fe reordered, adv to reg diet      SUBJECTIVE: Patient seen and examined bedside by surgery team. Fever overnight, but denies nausea, vomiting, chest pain, shortness of breath, fevers, or chills.        heparin  Injectable 5000 Unit(s) SubCutaneous every 12 hours      Vital Signs Last 24 Hrs  T(C): 37.2 (2018 05:37), Max: 38.7 (15 Shaun 2018 21:12)  T(F): 99 (2018 05:37), Max: 101.7 (15 Shaun 2018 21:12)  HR: 89 (2018 05:37) (85 - 111)  BP: 113/77 (2018 05:37) (101/64 - 121/79)  BP(mean): --  RR: 16 (2018 05:37) (15 - 18)  SpO2: 100% (2018 05:37) (98% - 100%)  I&O's Detail    15 Shaun 2018 07:01  -  2018 07:00  --------------------------------------------------------  IN:    dextrose 5% + sodium chloride 0.45%.: 450 mL    IV PiggyBack: 500 mL    Oral Fluid: 920 mL    Solution: 110 mL  Total IN: 1980 mL    OUT:    Voided: 960 mL  Total OUT: 960 mL    Total NET: 1020 mL            Physical Exam  General: NAD, alert, interactive, appears comfortable  Pulm: Nonlabored breathing, no respiratory distress  Abd: softly distended, NT/ND. incisions CDI  Extrem: WWP, no edema, SCDs in place        LABS:                        8.5    14.1  )-----------( 632      ( 15 Shaun 2018 16:56 )             29.0     -15    140  |  104  |  5<L>  ----------------------------<  118<H>  3.8   |  26  |  0.62    Ca    8.3<L>      15 Shaun 2018 06:31  Phos  1.6     01-15  Mg     1.8     -15        Urinalysis Basic - ( 15 Shaun 2018 22:40 )    Color: Yellow / Appearance: Clear / S.025 / pH: x  Gluc: x / Ketone: 15 mg/dL  / Bili: Small / Urobili: 2.0 E.U./dL   Blood: x / Protein: Trace mg/dL / Nitrite: NEGATIVE   Leuk Esterase: NEGATIVE / RBC: < 5 /HPF / WBC 5-10 /HPF   Sq Epi: x / Non Sq Epi: x / Bacteria: None /HPF        RADIOLOGY & ADDITIONAL STUDIES:

## 2018-01-16 NOTE — PROGRESS NOTE ADULT - ASSESSMENT
27y Female with SBO/intussusception now s/p Diagnostic laparoscopy, small bowel resection, ileocecectomy 1/12    Diet : Regular diet with ensure TID  Pain/nausea control - IV tylenol and toradol PRN  DVT ppx: SCDS/HSQ  F/up path report  AM labs (total pRBC transfusion : 1Unit)  Iron sucrose injection (1/10-1/15)   Dispo plan: Home with iron supplement

## 2018-01-17 LAB
ANION GAP SERPL CALC-SCNC: 11 MMOL/L — SIGNIFICANT CHANGE UP (ref 5–17)
BUN SERPL-MCNC: 6 MG/DL — LOW (ref 7–23)
CALCIUM SERPL-MCNC: 8.1 MG/DL — LOW (ref 8.4–10.5)
CHLORIDE SERPL-SCNC: 103 MMOL/L — SIGNIFICANT CHANGE UP (ref 96–108)
CO2 SERPL-SCNC: 24 MMOL/L — SIGNIFICANT CHANGE UP (ref 22–31)
CREAT SERPL-MCNC: 0.68 MG/DL — SIGNIFICANT CHANGE UP (ref 0.5–1.3)
GLUCOSE SERPL-MCNC: 115 MG/DL — HIGH (ref 70–99)
GRAM STN FLD: SIGNIFICANT CHANGE UP
HCT VFR BLD CALC: 25.5 % — LOW (ref 34.5–45)
HGB BLD-MCNC: 7.4 G/DL — LOW (ref 11.5–15.5)
MAGNESIUM SERPL-MCNC: 2 MG/DL — SIGNIFICANT CHANGE UP (ref 1.6–2.6)
MCHC RBC-ENTMCNC: 22 PG — LOW (ref 27–34)
MCHC RBC-ENTMCNC: 29 G/DL — LOW (ref 32–36)
MCV RBC AUTO: 75.7 FL — LOW (ref 80–100)
PHOSPHATE SERPL-MCNC: 2.2 MG/DL — LOW (ref 2.5–4.5)
PLATELET # BLD AUTO: 618 K/UL — HIGH (ref 150–400)
POTASSIUM SERPL-MCNC: 3.6 MMOL/L — SIGNIFICANT CHANGE UP (ref 3.5–5.3)
POTASSIUM SERPL-SCNC: 3.6 MMOL/L — SIGNIFICANT CHANGE UP (ref 3.5–5.3)
RBC # BLD: 3.37 M/UL — LOW (ref 3.8–5.2)
RBC # FLD: 31.1 % — HIGH (ref 10.3–16.9)
SODIUM SERPL-SCNC: 138 MMOL/L — SIGNIFICANT CHANGE UP (ref 135–145)
SPECIMEN SOURCE: SIGNIFICANT CHANGE UP
SURGICAL PATHOLOGY STUDY: SIGNIFICANT CHANGE UP
WBC # BLD: 14.9 K/UL — HIGH (ref 3.8–10.5)
WBC # FLD AUTO: 14.9 K/UL — HIGH (ref 3.8–10.5)

## 2018-01-17 RX ORDER — POTASSIUM PHOSPHATE, MONOBASIC POTASSIUM PHOSPHATE, DIBASIC 236; 224 MG/ML; MG/ML
15 INJECTION, SOLUTION INTRAVENOUS ONCE
Qty: 0 | Refills: 0 | Status: COMPLETED | OUTPATIENT
Start: 2018-01-17 | End: 2018-01-17

## 2018-01-17 RX ORDER — POTASSIUM CHLORIDE 20 MEQ
10 PACKET (EA) ORAL
Qty: 0 | Refills: 0 | Status: COMPLETED | OUTPATIENT
Start: 2018-01-17 | End: 2018-01-17

## 2018-01-17 RX ORDER — PIPERACILLIN AND TAZOBACTAM 4; .5 G/20ML; G/20ML
3.38 INJECTION, POWDER, LYOPHILIZED, FOR SOLUTION INTRAVENOUS EVERY 6 HOURS
Qty: 0 | Refills: 0 | Status: DISCONTINUED | OUTPATIENT
Start: 2018-01-17 | End: 2018-01-24

## 2018-01-17 RX ORDER — KETOROLAC TROMETHAMINE 30 MG/ML
15 SYRINGE (ML) INJECTION ONCE
Qty: 0 | Refills: 0 | Status: DISCONTINUED | OUTPATIENT
Start: 2018-01-17 | End: 2018-01-17

## 2018-01-17 RX ORDER — ACETAMINOPHEN 500 MG
700 TABLET ORAL ONCE
Qty: 0 | Refills: 0 | Status: DISCONTINUED | OUTPATIENT
Start: 2018-01-17 | End: 2018-01-22

## 2018-01-17 RX ADMIN — SODIUM CHLORIDE 90 MILLILITER(S): 9 INJECTION, SOLUTION INTRAVENOUS at 06:40

## 2018-01-17 RX ADMIN — PIPERACILLIN AND TAZOBACTAM 200 GRAM(S): 4; .5 INJECTION, POWDER, LYOPHILIZED, FOR SOLUTION INTRAVENOUS at 18:10

## 2018-01-17 RX ADMIN — Medication 100 MILLIEQUIVALENT(S): at 18:11

## 2018-01-17 RX ADMIN — POTASSIUM PHOSPHATE, MONOBASIC POTASSIUM PHOSPHATE, DIBASIC 62.5 MILLIMOLE(S): 236; 224 INJECTION, SOLUTION INTRAVENOUS at 11:38

## 2018-01-17 RX ADMIN — SODIUM CHLORIDE 90 MILLILITER(S): 9 INJECTION, SOLUTION INTRAVENOUS at 02:15

## 2018-01-17 RX ADMIN — PIPERACILLIN AND TAZOBACTAM 200 GRAM(S): 4; .5 INJECTION, POWDER, LYOPHILIZED, FOR SOLUTION INTRAVENOUS at 11:37

## 2018-01-17 RX ADMIN — Medication 100 MILLIEQUIVALENT(S): at 19:00

## 2018-01-17 RX ADMIN — PIPERACILLIN AND TAZOBACTAM 200 GRAM(S): 4; .5 INJECTION, POWDER, LYOPHILIZED, FOR SOLUTION INTRAVENOUS at 02:11

## 2018-01-17 RX ADMIN — ENOXAPARIN SODIUM 50 MILLIGRAM(S): 100 INJECTION SUBCUTANEOUS at 18:10

## 2018-01-17 RX ADMIN — Medication 15 MILLIGRAM(S): at 03:36

## 2018-01-17 RX ADMIN — Medication 15 MILLIGRAM(S): at 04:00

## 2018-01-17 RX ADMIN — PANTOPRAZOLE SODIUM 40 MILLIGRAM(S): 20 TABLET, DELAYED RELEASE ORAL at 13:25

## 2018-01-17 RX ADMIN — PIPERACILLIN AND TAZOBACTAM 200 GRAM(S): 4; .5 INJECTION, POWDER, LYOPHILIZED, FOR SOLUTION INTRAVENOUS at 23:56

## 2018-01-17 RX ADMIN — Medication 280 MILLIGRAM(S): at 06:40

## 2018-01-17 RX ADMIN — ENOXAPARIN SODIUM 50 MILLIGRAM(S): 100 INJECTION SUBCUTANEOUS at 07:27

## 2018-01-17 NOTE — PROGRESS NOTE ADULT - SUBJECTIVE AND OBJECTIVE BOX
ON : CDiff neg. thrombus on IMV, started Lovenox 50mg BID.   1/16: back to full diet (per Tim); Bcx no growth to date, T.max 104 made NPO, repeat BCx, pending CT abd/pelvis, no collectons seen, having diarhea- pending C.DIFF, d/c IV iron ON : CDiff neg. thrombus on IMV, started Lovenox 50mg BID.   : back to full diet (per Dumont); Bcx no growth to date, T.max 104 made NPO, repeat BCx, pending CT abd/pelvis, no collectons seen, having diarhea- pending C.DIFF, d/c IV iron        STATUS POST:   exlap and SBR    POST OPERATIVE DAY #: 5    SUBJECTIVE:  Flatus: [X ] YES [ ] NO             Bowel Movement: [X ] YES [ ] NO  Pain (0-10):            Pain Control Adequate: [X ] YES [ ] NO  Nausea: [ ] YES [X ] NO            Vomiting: [ ] YES [X ] NO  Diarrhea: [ ] YES [X ] NO         Constipation: [ ] YES [X ] NO     Chest Pain: [ ] YES [X ] NO    SOB:  [ ] YES [ X] NO    MEDICATIONS  (STANDING):  bisacodyl Suppository 10 milliGRAM(s) Rectal at bedtime  dextrose 5% + sodium chloride 0.45%. 1000 milliLiter(s) (90 mL/Hr) IV Continuous <Continuous>  enoxaparin Injectable 50 milliGRAM(s) SubCutaneous every 12 hours  metoclopramide Injectable 10 milliGRAM(s) IV Push once  pantoprazole  Injectable 40 milliGRAM(s) IV Push daily  piperacillin/tazobactam IVPB. 3.375 Gram(s) IV Intermittent every 6 hours    MEDICATIONS  (PRN):  ondansetron Injectable 4 milliGRAM(s) IV Push every 6 hours PRN Nausea      Vital Signs Last 24 Hrs  T(C): 36.8 (2018 06:07), Max: 39.8 (2018 13:13)  T(F): 98.2 (2018 06:07), Max: 103.7 (2018 13:13)  HR: 92 (2018 05:00) (91 - 123)  BP: 104/60 (2018 05:00) (100/59 - 125/81)  BP(mean): 77 (2018 05:00) (75 - 83)  RR: 17 (2018 05:00) (16 - 20)  SpO2: 98% (2018 05:00) (96% - 99%)    PHYSICAL EXAM:      Constitutional: A&Ox3      Respiratory: non labored breathing, no respiratory distress    Cardiovascular: NSR, RRR    Gastrointestinal: Soft, ND, TTP diffusely                 Incision: CDI    Genitourinary: voiding    Extremities: (-) edema                  I&O's Detail    2018 07:01  -  2018 07:00  --------------------------------------------------------  IN:    dextrose 5% + sodium chloride 0.45%.: 487 mL    dextrose 5% + sodium chloride 0.45%.: 900 mL    IV PiggyBack: 50 mL    Oral Fluid: 300 mL    Solution: 200 mL  Total IN: 1937 mL    OUT:    Voided: 250 mL  Total OUT: 250 mL    Total NET: 1687 mL          LABS:                        7.4    14.9  )-----------( 618      ( 2018 06:42 )             25.5         138  |  103  |  6<L>  ----------------------------<  115<H>  3.6   |  24  |  0.68    Ca    8.1<L>      2018 06:40  Phos  2.2       Mg     2.0             Urinalysis Basic - ( 15 Shaun 2018 22:40 )    Color: Yellow / Appearance: Clear / S.025 / pH: x  Gluc: x / Ketone: 15 mg/dL  / Bili: Small / Urobili: 2.0 E.U./dL   Blood: x / Protein: Trace mg/dL / Nitrite: NEGATIVE   Leuk Esterase: NEGATIVE / RBC: < 5 /HPF / WBC 5-10 /HPF   Sq Epi: x / Non Sq Epi: x / Bacteria: None /HPF        RADIOLOGY & ADDITIONAL STUDIES: ON : CDiff neg. thrombus on IMV, started Lovenox 50mg BID.   : back to full diet (per Dumont); Bcx no growth to date, T.max 104 made NPO, repeat BCx, pending CT abd/pelvis, no collectons seen, having diarhea- pending C.DIFF, d/c IV iron        STATUS POST:   exlap and SBR    POST OPERATIVE DAY #: 5    SUBJECTIVE:   Flatus: [X ] YES [ ] NO             Bowel Movement: [X ] YES [ ] NO  Pain (0-10):            Pain Control Adequate: [X ] YES [ ] NO  Nausea: [ ] YES [X ] NO            Vomiting: [ ] YES [X ] NO  Diarrhea: [ ] YES [X ] NO         Constipation: [ ] YES [X ] NO     Chest Pain: [ ] YES [X ] NO    SOB:  [ ] YES [ X] NO    MEDICATIONS  (STANDING):  bisacodyl Suppository 10 milliGRAM(s) Rectal at bedtime  dextrose 5% + sodium chloride 0.45%. 1000 milliLiter(s) (90 mL/Hr) IV Continuous <Continuous>  enoxaparin Injectable 50 milliGRAM(s) SubCutaneous every 12 hours  metoclopramide Injectable 10 milliGRAM(s) IV Push once  pantoprazole  Injectable 40 milliGRAM(s) IV Push daily  piperacillin/tazobactam IVPB. 3.375 Gram(s) IV Intermittent every 6 hours    MEDICATIONS  (PRN):  ondansetron Injectable 4 milliGRAM(s) IV Push every 6 hours PRN Nausea      Vital Signs Last 24 Hrs  T(C): 36.8 (2018 06:07), Max: 39.8 (2018 13:13)  T(F): 98.2 (2018 06:07), Max: 103.7 (2018 13:13)  HR: 92 (2018 05:00) (91 - 123)  BP: 104/60 (2018 05:00) (100/59 - 125/81)  BP(mean): 77 (2018 05:00) (75 - 83)  RR: 17 (2018 05:00) (16 - 20)  SpO2: 98% (2018 05:00) (96% - 99%)    PHYSICAL EXAM:      Constitutional: A&Ox3      Respiratory: non labored breathing, no respiratory distress    Cardiovascular: NSR, RRR    Gastrointestinal: Soft, ND, TTP diffusely                 Incision: CDI    Genitourinary: voiding    Extremities: (-) edema                  I&O's Detail    2018 07:01  -  2018 07:00  --------------------------------------------------------  IN:    dextrose 5% + sodium chloride 0.45%.: 487 mL    dextrose 5% + sodium chloride 0.45%.: 900 mL    IV PiggyBack: 50 mL    Oral Fluid: 300 mL    Solution: 200 mL  Total IN: 1937 mL    OUT:    Voided: 250 mL  Total OUT: 250 mL    Total NET: 1687 mL          LABS:                        7.4    14.9  )-----------( 618      ( 2018 06:42 )             25.5         138  |  103  |  6<L>  ----------------------------<  115<H>  3.6   |  24  |  0.68    Ca    8.1<L>      2018 06:40  Phos  2.2       Mg     2.0             Urinalysis Basic - ( 15 Shaun 2018 22:40 )    Color: Yellow / Appearance: Clear / S.025 / pH: x  Gluc: x / Ketone: 15 mg/dL  / Bili: Small / Urobili: 2.0 E.U./dL   Blood: x / Protein: Trace mg/dL / Nitrite: NEGATIVE   Leuk Esterase: NEGATIVE / RBC: < 5 /HPF / WBC 5-10 /HPF   Sq Epi: x / Non Sq Epi: x / Bacteria: None /HPF        RADIOLOGY & ADDITIONAL STUDIES:

## 2018-01-17 NOTE — CHART NOTE - NSCHARTNOTEFT_GEN_A_CORE
Admitting Diagnosis:   Patient is a 27y old  Female who presents with a chief complaint of Abdominal pain for 3 days (09 Jan 2018 00:22)      PAST MEDICAL & SURGICAL HISTORY:  Intestinal adhesions with obstruction, unspecified whether partial or complete: diagnostic lap with SANDRO  H/O exploratory laparotomy: with SBR and appendectomy      Current Nutrition Order: NPO (Noted pt has been NPO/Clears since 1/11).    PO Intake: Good (%) [   ]  Fair (50-75%) [   ] Poor (<25%) [   ] N/A    GI Issues: Reports having diarrhea x4/day    Pain: No pain reported    Skin Integrity: Surgical incision    Labs:   01-17    138  |  103  |  6<L>  ----------------------------<  115<H>  3.6   |  24  |  0.68    Ca    8.1<L>      17 Jan 2018 06:40  Phos  2.2     01-17  Mg     2.0     01-17      Medications:  MEDICATIONS  (STANDING):  bisacodyl Suppository 10 milliGRAM(s) Rectal at bedtime  dextrose 5% + sodium chloride 0.45%. 1000 milliLiter(s) (90 mL/Hr) IV Continuous <Continuous>  enoxaparin Injectable 50 milliGRAM(s) SubCutaneous every 12 hours  metoclopramide Injectable 10 milliGRAM(s) IV Push once  pantoprazole  Injectable 40 milliGRAM(s) IV Push daily  piperacillin/tazobactam IVPB. 3.375 Gram(s) IV Intermittent every 6 hours  potassium chloride  10 mEq/100 mL IVPB 10 milliEquivalent(s) IV Intermittent every 1 hour    MEDICATIONS  (PRN):  ondansetron Injectable 4 milliGRAM(s) IV Push every 6 hours PRN Nausea      Weight: No new wts taken.  Height: 4'11" Weight: 110lbs, IBW 98lbs+/-10%, %%, BMI 22.2    Weight Change:  Please obtain recent wt for trending.    Estimated energy needs:   ABW used for calculations as pt between % of IBW.   Nutrient needs based on [facility] standards of care for maintenance in adults.  Protein needs increased 2/2 increased demand s/p SBR, ileocecectomy  1245-1494kcal/day (25-30kcal/kg)  50-60g pro/day (1-1.2g/kg)  1245-1494ml fluid/day (25-30ml/kg)    Subjective:   S/p diag lap, SBR and ileocectomy 1/12. Pt seen resting in chair- unable to obtain new wt using bed scale. Currently NPO. Discussed diet advancement w/ team as she has not been ordered for solid food since 1/11. Per MD, no current plan for diet advancement d/t concern for infection. Pt reports having diarrhea ~4x/day. No other GI distress. Fluid needs increased 2/2 diarrhea. Please obtain new wt to assess for wt loss. Please advance to clears once medically feasible. If pt to remain NPO recommend alternative route for nutrition.     Previous Nutrition Diagnosis:  Inadequate oral intake RT unable to meet needs w/ current NPO diet order AEB pt meeting 0% estimated needs PO  Active [  x ]  Resolved [   ]    If resolved, new PES:     Goal: Pt to meet >50% estimated needs via most appropriate route    Recommendations:   1) Recommend trialing clears once medically feasible  2) Maintain hydration 2/2 diarrhea  3) If pt to remain NPO recommend alternative route of nutrition as pt has been NPO/Clears since 1/11.   Per ASPEN guidelines TPN indicated after 7-10days NPO in adequately nourished patients. Pt will need nutrition intervention by 1/20.   If TPN to be ordered, recommend:   Day 1: 150g Dex, 60g AA, 50g 20% lipids (3-4x/week)  Day 2: 220g Dex, 60g AA, 50g 20% lipids (3-4x/week  At goal this provides 1488kcal, 1.2g pro/kg BW, GIR 3.06.   Fluids, lytes and micronutrients per MD discretion.     Education: N/A as diet advancement is unclear at this time.    Risk Level: High [  x ] Moderate [   ] Low [   ]

## 2018-01-17 NOTE — PHYSICAL THERAPY INITIAL EVALUATION ADULT - PERTINENT HX OF CURRENT PROBLEM, REHAB EVAL
27 year old female presented on (1/8) for abdominal pain since Friday (1/5) which related to food intake. The pain is acute in onset, located at epigastric area in the beginning and shifted to RLQ. There was no nausea or vomiting and no fever. Patient went to [facility] and CT scan revealed a left lower quadrant enteroenteric intussusception and  2.3 x 1.8 x 2.1 cm polypoid lesion (small bowel tumor)

## 2018-01-17 NOTE — PROGRESS NOTE ADULT - ASSESSMENT
27y Female with SBO/intussusception now s/p Diagnostic laparoscopy, small bowel resection, ileocecectomy 1/12    Pain/nausea control - IV tylenol and toradol PRN  Diet : NPO / IVF  Dulcolax Suppository   DVT ppx: SCDS/HSQ  F/u path report  AM labs (total pRBC transfusion : 1Unit)  Iron sucrose injection (1/10-1/15)   Dispo plan: Home with iron supplement

## 2018-01-17 NOTE — PHYSICAL THERAPY INITIAL EVALUATION ADULT - CRITERIA FOR SKILLED THERAPEUTIC INTERVENTIONS
rehab potential/impairments found/functional limitations in following categories/therapy frequency/anticipated discharge recommendation

## 2018-01-18 LAB
ANION GAP SERPL CALC-SCNC: 12 MMOL/L — SIGNIFICANT CHANGE UP (ref 5–17)
BUN SERPL-MCNC: 4 MG/DL — LOW (ref 7–23)
CALCIUM SERPL-MCNC: 8.4 MG/DL — SIGNIFICANT CHANGE UP (ref 8.4–10.5)
CHLORIDE SERPL-SCNC: 102 MMOL/L — SIGNIFICANT CHANGE UP (ref 96–108)
CO2 SERPL-SCNC: 25 MMOL/L — SIGNIFICANT CHANGE UP (ref 22–31)
CREAT SERPL-MCNC: 0.64 MG/DL — SIGNIFICANT CHANGE UP (ref 0.5–1.3)
GLUCOSE SERPL-MCNC: 93 MG/DL — SIGNIFICANT CHANGE UP (ref 70–99)
HCT VFR BLD CALC: 26.6 % — LOW (ref 34.5–45)
HGB BLD-MCNC: 7.5 G/DL — LOW (ref 11.5–15.5)
MAGNESIUM SERPL-MCNC: 1.8 MG/DL — SIGNIFICANT CHANGE UP (ref 1.6–2.6)
MCHC RBC-ENTMCNC: 21.6 PG — LOW (ref 27–34)
MCHC RBC-ENTMCNC: 28.2 G/DL — LOW (ref 32–36)
MCV RBC AUTO: 76.7 FL — LOW (ref 80–100)
PHOSPHATE SERPL-MCNC: 1.8 MG/DL — LOW (ref 2.5–4.5)
PLATELET # BLD AUTO: 735 K/UL — HIGH (ref 150–400)
POTASSIUM SERPL-MCNC: 3.8 MMOL/L — SIGNIFICANT CHANGE UP (ref 3.5–5.3)
POTASSIUM SERPL-SCNC: 3.8 MMOL/L — SIGNIFICANT CHANGE UP (ref 3.5–5.3)
RBC # BLD: 3.47 M/UL — LOW (ref 3.8–5.2)
RBC # FLD: 31 % — HIGH (ref 10.3–16.9)
SODIUM SERPL-SCNC: 139 MMOL/L — SIGNIFICANT CHANGE UP (ref 135–145)
WBC # BLD: 13.4 K/UL — HIGH (ref 3.8–10.5)
WBC # FLD AUTO: 13.4 K/UL — HIGH (ref 3.8–10.5)

## 2018-01-18 RX ORDER — SODIUM,POTASSIUM PHOSPHATES 278-250MG
1 POWDER IN PACKET (EA) ORAL
Qty: 0 | Refills: 0 | Status: COMPLETED | OUTPATIENT
Start: 2018-01-18 | End: 2018-01-19

## 2018-01-18 RX ADMIN — PIPERACILLIN AND TAZOBACTAM 200 GRAM(S): 4; .5 INJECTION, POWDER, LYOPHILIZED, FOR SOLUTION INTRAVENOUS at 18:34

## 2018-01-18 RX ADMIN — PIPERACILLIN AND TAZOBACTAM 200 GRAM(S): 4; .5 INJECTION, POWDER, LYOPHILIZED, FOR SOLUTION INTRAVENOUS at 06:02

## 2018-01-18 RX ADMIN — Medication 1 TABLET(S): at 18:34

## 2018-01-18 RX ADMIN — PANTOPRAZOLE SODIUM 40 MILLIGRAM(S): 20 TABLET, DELAYED RELEASE ORAL at 11:51

## 2018-01-18 RX ADMIN — PIPERACILLIN AND TAZOBACTAM 200 GRAM(S): 4; .5 INJECTION, POWDER, LYOPHILIZED, FOR SOLUTION INTRAVENOUS at 11:52

## 2018-01-18 RX ADMIN — Medication 1 TABLET(S): at 11:51

## 2018-01-18 RX ADMIN — Medication 1 TABLET(S): at 21:44

## 2018-01-18 RX ADMIN — SODIUM CHLORIDE 90 MILLILITER(S): 9 INJECTION, SOLUTION INTRAVENOUS at 11:51

## 2018-01-18 RX ADMIN — ENOXAPARIN SODIUM 50 MILLIGRAM(S): 100 INJECTION SUBCUTANEOUS at 06:02

## 2018-01-18 RX ADMIN — ENOXAPARIN SODIUM 50 MILLIGRAM(S): 100 INJECTION SUBCUTANEOUS at 18:34

## 2018-01-18 NOTE — PROGRESS NOTE ADULT - SUBJECTIVE AND OBJECTIVE BOX
ON : Aferbile overnight. VSS. Pain is controlled. WBC : 14.9  1/17: No growth in BCx. MARY, VSS. Noted some discharge from the wound, cultured.     Surgery:  1/12: Diagnositic lap, SBR with ileocecectomy.

## 2018-01-18 NOTE — PROGRESS NOTE ADULT - ASSESSMENT
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    #Microcytic Anemia  Patient w/ microcytosis upon admission, initial Hg of 8.3 could represent concentrated sample as pt received 2L NS bolus as well as LR. In addition, patient currently on menses w/ blood loss. Patient denies any other gross signs of bleeding currently. Clinically non jaundiced appearing. On smear, increased anisocytosis and poikilocytosis including tear drop and elliptocytes, cells also hypochromic w/ increased central pallor consistent with anemia. Hemoglobinopathy eval negative for sickle cell. SUDHA negative. Hemolysis labs negative. Iron panel c/w severe iron deficiency s/p 5of 5 doses of IV iron w/ low corrected reticulocytes. B12/folate levels normal.     -Trend CBC  -complete 2 more doses of IV iron through 1/14 Sun   -switch to oral Fe as tolerated    #Elevated PTT - resolved, likely spurious or related to hep sq exposure     #Small bowel Tumor/intussusception  #Thrombus of single branch of IMV  complicated by SBO, innumerable nodules seen in small bowel, 2.5cm intraluminal mass lead point, mass lesion in the cecum. S/p polypectomy and diagnostic lap w/ SBR and ileocecetomy POD7, LNs negative for tumor, lesions c/w hyperplasia of mucosal/lymphoid tissue, hamartomatous polyps. CEA borderline may consider repeating at later date,  negative. No lymphadenopathy clinically on exam on on CT, LDH wnl. Thrombus provoked likely 2/2 acute surgical intervention.    -s/p OR, POD7 diagnostic lap w/ SBR and ileocecetomy POD7  -path negative for neoplasm  -c/w Lovneox, likely for 3 months     #Thrombocytopenia resolved  #Thrombocytosis - likely reactive 2/2 acute inflammatory process s/p surgery and infection requiring IV abx with underlying DARIEL. Negative HIV, hepatitis panel.     -trend CBC      Discussed w/ attending  Call back with additional questions     Plan for outpatient follow up at [address] [address]  Scheduled for 2/7/18 at 11:30 with Dr. 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    #Microcytic Anemia  Patient w/ microcytosis upon admission, initial Hg of 8.3 could represent concentrated sample as pt received 2L NS bolus as well as LR. In addition, patient currently on menses w/ blood loss. Patient denies any other gross signs of bleeding currently. Clinically non jaundiced appearing. On smear, increased anisocytosis and poikilocytosis including tear drop and elliptocytes, cells also hypochromic w/ increased central pallor consistent with anemia. Hemoglobinopathy eval negative for sickle cell. SUDHA negative. Hemolysis labs negative. Iron panel c/w severe iron deficiency s/p 5of 5 doses of IV iron w/ low corrected reticulocytes. B12/folate levels normal.     -Trend CBC  -completed 5 doses of IV iron  -switch to oral Fe as tolerated    #Elevated PTT - resolved, likely spurious or related to hep sq exposure     #Small bowel Tumor/intussusception  #Thrombus of single branch of IMV  complicated by SBO, innumerable nodules seen in small bowel, 2.5cm intraluminal mass lead point, mass lesion in the cecum. S/p polypectomy and diagnostic lap w/ SBR and ileocecetomy POD7, LNs negative for tumor, lesions c/w hyperplasia of mucosal/lymphoid tissue, hamartomatous polyps. CEA borderline may consider repeating at later date,  negative. No lymphadenopathy clinically on exam on on CT, LDH wnl. Thrombus provoked likely 2/2 acute surgical intervention.    -s/p OR, POD7 diagnostic lap w/ SBR and ileocecetomy POD7  -path negative for neoplasm  -c/w Lovneox tx dose, likely for 3 months     #Thrombocytopenia resolved  #Thrombocytosis - likely reactive 2/2 acute inflammatory process s/p surgery and infection requiring IV abx with underlying DARIEL. Negative HIV, hepatitis panel.     -trend CBC      Discussed w/ attending  Call back with additional questions     Plan [place] [address]  Scheduled for 2/7/18 at 11:30 with Dr. 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    #Microcytic Anemia  Patient w/ microcytosis upon admission, initial Hg of 8.3 could represent concentrated sample as pt received 2L NS bolus as well as LR. In addition, patient currently on menses w/ blood loss. Patient denies any other gross signs of bleeding currently. Clinically non jaundiced appearing. On smear, increased anisocytosis and poikilocytosis including tear drop and elliptocytes, cells also hypochromic w/ increased central pallor consistent with anemia. Hemoglobinopathy eval negative for sickle cell. SUDHA negative. Hemolysis labs negative. Iron panel c/w severe iron deficiency s/p 5of 5 doses of IV iron w/ low corrected reticulocytes. B12/folate levels normal.     -Trend CBC  -completed 5 doses of IV iron  -switch to oral Fe as tolerated    #Elevated PTT - resolved, likely spurious or related to hep sq exposure     #Small bowel Tumor/intussusception  #Thrombus of single branch of IMV  complicated by SBO, innumerable nodules seen in small bowel, 2.5cm intraluminal mass lead point, mass lesion in the cecum. S/p polypectomy and diagnostic lap w/ SBR and ileocecetomy POD7, LNs negative for tumor, lesions c/w hyperplasia of mucosal/lymphoid tissue, hamartomatous polyps. CEA borderline may consider repeating at later date,  negative. No lymphadenopathy clinically on exam on on CT, LDH wnl. Thrombus provoked likely 2/2 acute surgical intervention.    -s/p OR, POD7 diagnostic lap w/ SBR and ileocecetomy POD7  -path negative for neoplasm  -c/w Lovneox tx dose, likely for 3-6 months     #Thrombocytopenia resolved  #Thrombocytosis - likely reactive 2/2 acute inflammatory process s/p surgery and infection requiring IV abx with underlying DARIEL. Negative HIV, hepatitis panel.     -trend CBC      Discussed w/ attending  Call back with additional questions     [place] [address]  Scheduled for 2/7/18 at 11:30 with Dr. Ramírez   #[phone]

## 2018-01-18 NOTE — PROGRESS NOTE ADULT - SUBJECTIVE AND OBJECTIVE BOX
Heme/Onc Progress Note (Dr. Lee)     Interval History: Patient stable overnight. Has some discharge from the wound site which was sent for culture. No gross bleeding or bruising including BRBPR, melena or hematuria. No acute chest pain, shortness of breath, fevers or chills.       ROS is otherwise negative.      Allergies    No Known Allergies    Intolerances        Medications:  MEDICATIONS  (STANDING):  acetaminophen  IVPB. 700 milliGRAM(s) IV Intermittent once  dextrose 5% + sodium chloride 0.45%. 1000 milliLiter(s) (90 mL/Hr) IV Continuous <Continuous>  enoxaparin Injectable 50 milliGRAM(s) SubCutaneous every 12 hours  lidocaine 1%/EPINEPHrine 1:100,000 Inj 10 milliLiter(s) Local Injection once  pantoprazole  Injectable 40 milliGRAM(s) IV Push daily  piperacillin/tazobactam IVPB. 3.375 Gram(s) IV Intermittent every 6 hours  potassium acid phosphate/sodium acid phosphate tablet (K-PHOS No. 2) 1 Tablet(s) Oral four times a day with meals    MEDICATIONS  (PRN):  ondansetron Injectable 4 milliGRAM(s) IV Push every 6 hours PRN Nausea    enoxaparin Injectable 50 milliGRAM(s) SubCutaneous every 12 hours          PHYSICAL EXAM:    T(F): 98.4 (01-18-18 @ 12:03), Max: 100.4 (01-17-18 @ 17:14)  HR: 101 (01-18-18 @ 12:03) (90 - 102)  BP: 110/73 (01-18-18 @ 12:03) (100/62 - 130/73)  RR: 16 (01-18-18 @ 12:03) (13 - 23)  SpO2: 100% (01-18-18 @ 12:03) (95% - 100%)  Wt(kg): --    Daily     Daily     Pending*      Labs:                          7.5    13.4  )-----------( 735      ( 18 Jan 2018 06:04 )             26.6     CBC Full  -  ( 18 Jan 2018 06:04 )  WBC Count : 13.4 K/uL  Hemoglobin : 7.5 g/dL  Hematocrit : 26.6 %  Platelet Count - Automated : 735 K/uL  Mean Cell Volume : 76.7 fL  Mean Cell Hemoglobin : 21.6 pg  Mean Cell Hemoglobin Concentration : 28.2 g/dL  Auto Neutrophil # : x  Auto Lymphocyte # : x  Auto Monocyte # : x  Auto Eosinophil # : x  Auto Basophil # : x  Auto Neutrophil % : x  Auto Lymphocyte % : x  Auto Monocyte % : x  Auto Eosinophil % : x  Auto Basophil % : x        01-18    139  |  102  |  4<L>  ----------------------------<  93  3.8   |  25  |  0.64    Ca    8.4      18 Jan 2018 06:05  Phos  1.8     01-18  Mg     1.8     01-18          CT A/P w IVC - IMPRESSION:    Interval resection of the ileal mass and small bowel anastomosis.   Separate anastomosis at the ileocecal valve.    Dilated distal loops of small bowel be due to ileus or early obstruction.   Proximal small bowel loops are not dilated.    Small to moderate ascites. Small pneumoperitoneum. Cannot exclude   anastomotic leak without oral contrast.    Thrombus in ileal branch of the inferior mesenteric vein.    Wall thickening of distal small bowel loops could be due to postoperative   edema, venous thrombosis or peritonitis.    Surgical Path - 1. Terminal ileum and cecum, ileocecectomy:  - Cystic focus of benign small bowel mucosa in muscularis  propria at ileocolic junction with adjacent mucinous and calcific  debris extending into surrounding pericecal soft tissue  - Hyperplasia ileal mucosal lymphoid tissue with focal  mucosal ulcerations  - 21 of 21 lymph nodes negative for tumor.    - Note: There is a cystic nodule of benign small bowel  mucosa located in the muscular wall of the cecum at the ileocecal  junction.  A portion of the nodule extends into the pericolic  soft tissue and there appears to be a site of perforation with  extension of the mucinous debris which is contained in the lumen  of the mucosal nodule into the adjacent pericecal soft tissue.  The mucosa shows patchy villous hyperplasia.   No tumor is seen.  The nodule is possibly the result of previous surgery or some  developmental abnormality.  Correlate clinically.    2. Ileum, partial ileectomy:  - Segment of small bowel with intussusception and  hamartomatous polyp showing ectopic extension into muscularis  propria  - Foci of fibroadipose serosal adhesions causing slight  kinking of bowel  - 5 of 5 lymph nodes negative for tumor    - Note: Although not diagnostic, the polyp and its ectopic  extension into the muscular wall suggests the possibility  of a Peutz-Jeghers polyp.  Correlate clinically and genetically. Heme/Onc Progress Note (Dr. Lee)     Interval History: Patient stable overnight. Has some discharge from the wound site which was sent for culture. No gross bleeding or bruising including BRBPR, melena or hematuria. No acute chest pain, shortness of breath, fevers or chills.       ROS is otherwise negative.      Allergies    No Known Allergies    Intolerances        Medications:  MEDICATIONS  (STANDING):  acetaminophen  IVPB. 700 milliGRAM(s) IV Intermittent once  dextrose 5% + sodium chloride 0.45%. 1000 milliLiter(s) (90 mL/Hr) IV Continuous <Continuous>  enoxaparin Injectable 50 milliGRAM(s) SubCutaneous every 12 hours  lidocaine 1%/EPINEPHrine 1:100,000 Inj 10 milliLiter(s) Local Injection once  pantoprazole  Injectable 40 milliGRAM(s) IV Push daily  piperacillin/tazobactam IVPB. 3.375 Gram(s) IV Intermittent every 6 hours  potassium acid phosphate/sodium acid phosphate tablet (K-PHOS No. 2) 1 Tablet(s) Oral four times a day with meals    MEDICATIONS  (PRN):  ondansetron Injectable 4 milliGRAM(s) IV Push every 6 hours PRN Nausea    enoxaparin Injectable 50 milliGRAM(s) SubCutaneous every 12 hours          PHYSICAL EXAM:    T(F): 98.4 (01-18-18 @ 12:03), Max: 100.4 (01-17-18 @ 17:14)  HR: 101 (01-18-18 @ 12:03) (90 - 102)  BP: 110/73 (01-18-18 @ 12:03) (100/62 - 130/73)  RR: 16 (01-18-18 @ 12:03) (13 - 23)  SpO2: 100% (01-18-18 @ 12:03) (95% - 100%)  Wt(kg): --    Daily     Daily     GEN: NAD  HEENT: AT/NC, EOMI  NECK: supple  LUNG: CTA BL   CVS: +S1S2 RRR   ABD: mild tenderness, dressing midline mild bloodtinged discharge  EXT: no c/c/e  NEURO:AAox3    Labs:                          7.5    13.4  )-----------( 735      ( 18 Jan 2018 06:04 )             26.6     CBC Full  -  ( 18 Jan 2018 06:04 )  WBC Count : 13.4 K/uL  Hemoglobin : 7.5 g/dL  Hematocrit : 26.6 %  Platelet Count - Automated : 735 K/uL  Mean Cell Volume : 76.7 fL  Mean Cell Hemoglobin : 21.6 pg  Mean Cell Hemoglobin Concentration : 28.2 g/dL  Auto Neutrophil # : x  Auto Lymphocyte # : x  Auto Monocyte # : x  Auto Eosinophil # : x  Auto Basophil # : x  Auto Neutrophil % : x  Auto Lymphocyte % : x  Auto Monocyte % : x  Auto Eosinophil % : x  Auto Basophil % : x        01-18    139  |  102  |  4<L>  ----------------------------<  93  3.8   |  25  |  0.64    Ca    8.4      18 Jan 2018 06:05  Phos  1.8     01-18  Mg     1.8     01-18          CT A/P w IVC - IMPRESSION:    Interval resection of the ileal mass and small bowel anastomosis.   Separate anastomosis at the ileocecal valve.    Dilated distal loops of small bowel be due to ileus or early obstruction.   Proximal small bowel loops are not dilated.    Small to moderate ascites. Small pneumoperitoneum. Cannot exclude   anastomotic leak without oral contrast.    Thrombus in ileal branch of the inferior mesenteric vein.    Wall thickening of distal small bowel loops could be due to postoperative   edema, venous thrombosis or peritonitis.    Surgical Path - 1. Terminal ileum and cecum, ileocecectomy:  - Cystic focus of benign small bowel mucosa in muscularis  propria at ileocolic junction with adjacent mucinous and calcific  debris extending into surrounding pericecal soft tissue  - Hyperplasia ileal mucosal lymphoid tissue with focal  mucosal ulcerations  - 21 of 21 lymph nodes negative for tumor.    - Note: There is a cystic nodule of benign small bowel  mucosa located in the muscular wall of the cecum at the ileocecal  junction.  A portion of the nodule extends into the pericolic  soft tissue and there appears to be a site of perforation with  extension of the mucinous debris which is contained in the lumen  of the mucosal nodule into the adjacent pericecal soft tissue.  The mucosa shows patchy villous hyperplasia.   No tumor is seen.  The nodule is possibly the result of previous surgery or some  developmental abnormality.  Correlate clinically.    2. Ileum, partial ileectomy:  - Segment of small bowel with intussusception and  hamartomatous polyp showing ectopic extension into muscularis  propria  - Foci of fibroadipose serosal adhesions causing slight  kinking of bowel  - 5 of 5 lymph nodes negative for tumor    - Note: Although not diagnostic, the polyp and its ectopic  extension into the muscular wall suggests the possibility  of a Peutz-Jeghers polyp.  Correlate clinically and genetically.

## 2018-01-18 NOTE — PROGRESS NOTE ADULT - ASSESSMENT
27y Female with SBO/intussusception now s/p Diagnostic laparoscopy, small bowel resection, ileocecectomy 1/12, currently with MVT on Lovenox     Pain/nausea control - IV tylenol and toradol PRN  Diet : NPO since 1/11 / IVF  DVT ppx: SCDS/Lovenox 50mg BID  F/u path report  AM labs (total pRBC transfusion : 1Unit)  Iron sucrose injection (1/10-1/15)   Dispo plan: Home with iron supplement

## 2018-01-19 LAB
-  AMPICILLIN/SULBACTAM: SIGNIFICANT CHANGE UP
-  AMPICILLIN: SIGNIFICANT CHANGE UP
-  CEFAZOLIN: SIGNIFICANT CHANGE UP
-  CEFTRIAXONE: SIGNIFICANT CHANGE UP
-  CIPROFLOXACIN: SIGNIFICANT CHANGE UP
-  GENTAMICIN: SIGNIFICANT CHANGE UP
-  PIPERACILLIN/TAZOBACTAM: SIGNIFICANT CHANGE UP
-  TOBRAMYCIN: SIGNIFICANT CHANGE UP
-  TRIMETHOPRIM/SULFAMETHOXAZOLE: SIGNIFICANT CHANGE UP
ANION GAP SERPL CALC-SCNC: 10 MMOL/L — SIGNIFICANT CHANGE UP (ref 5–17)
BUN SERPL-MCNC: 2 MG/DL — LOW (ref 7–23)
CALCIUM SERPL-MCNC: 8.6 MG/DL — SIGNIFICANT CHANGE UP (ref 8.4–10.5)
CHLORIDE SERPL-SCNC: 102 MMOL/L — SIGNIFICANT CHANGE UP (ref 96–108)
CO2 SERPL-SCNC: 25 MMOL/L — SIGNIFICANT CHANGE UP (ref 22–31)
CREAT SERPL-MCNC: 0.64 MG/DL — SIGNIFICANT CHANGE UP (ref 0.5–1.3)
GLUCOSE SERPL-MCNC: 112 MG/DL — HIGH (ref 70–99)
HCT VFR BLD CALC: 28.1 % — LOW (ref 34.5–45)
HGB BLD-MCNC: 8.2 G/DL — LOW (ref 11.5–15.5)
MAGNESIUM SERPL-MCNC: 1.8 MG/DL — SIGNIFICANT CHANGE UP (ref 1.6–2.6)
MCHC RBC-ENTMCNC: 22 PG — LOW (ref 27–34)
MCHC RBC-ENTMCNC: 29.2 G/DL — LOW (ref 32–36)
MCV RBC AUTO: 75.5 FL — LOW (ref 80–100)
METHOD TYPE: SIGNIFICANT CHANGE UP
PHOSPHATE SERPL-MCNC: 2.2 MG/DL — LOW (ref 2.5–4.5)
PLATELET # BLD AUTO: 829 K/UL — HIGH (ref 150–400)
POTASSIUM SERPL-MCNC: 3.8 MMOL/L — SIGNIFICANT CHANGE UP (ref 3.5–5.3)
POTASSIUM SERPL-SCNC: 3.8 MMOL/L — SIGNIFICANT CHANGE UP (ref 3.5–5.3)
RBC # BLD: 3.72 M/UL — LOW (ref 3.8–5.2)
RBC # FLD: 30.4 % — HIGH (ref 10.3–16.9)
SODIUM SERPL-SCNC: 137 MMOL/L — SIGNIFICANT CHANGE UP (ref 135–145)
WBC # BLD: 10.8 K/UL — HIGH (ref 3.8–10.5)
WBC # FLD AUTO: 10.8 K/UL — HIGH (ref 3.8–10.5)

## 2018-01-19 RX ORDER — POTASSIUM PHOSPHATE, MONOBASIC POTASSIUM PHOSPHATE, DIBASIC 236; 224 MG/ML; MG/ML
15 INJECTION, SOLUTION INTRAVENOUS ONCE
Qty: 0 | Refills: 0 | Status: COMPLETED | OUTPATIENT
Start: 2018-01-19 | End: 2018-01-19

## 2018-01-19 RX ORDER — ACETAMINOPHEN 500 MG
500 TABLET ORAL ONCE
Qty: 0 | Refills: 0 | Status: COMPLETED | OUTPATIENT
Start: 2018-01-19 | End: 2018-01-19

## 2018-01-19 RX ORDER — MAGNESIUM SULFATE 500 MG/ML
2 VIAL (ML) INJECTION ONCE
Qty: 0 | Refills: 0 | Status: COMPLETED | OUTPATIENT
Start: 2018-01-19 | End: 2018-01-19

## 2018-01-19 RX ORDER — POTASSIUM CHLORIDE 20 MEQ
20 PACKET (EA) ORAL ONCE
Qty: 0 | Refills: 0 | Status: COMPLETED | OUTPATIENT
Start: 2018-01-19 | End: 2018-01-19

## 2018-01-19 RX ORDER — ACETAMINOPHEN 500 MG
650 TABLET ORAL EVERY 6 HOURS
Qty: 0 | Refills: 0 | Status: DISCONTINUED | OUTPATIENT
Start: 2018-01-19 | End: 2018-01-26

## 2018-01-19 RX ADMIN — Medication 500 MILLIGRAM(S): at 10:13

## 2018-01-19 RX ADMIN — PIPERACILLIN AND TAZOBACTAM 200 GRAM(S): 4; .5 INJECTION, POWDER, LYOPHILIZED, FOR SOLUTION INTRAVENOUS at 00:15

## 2018-01-19 RX ADMIN — PIPERACILLIN AND TAZOBACTAM 200 GRAM(S): 4; .5 INJECTION, POWDER, LYOPHILIZED, FOR SOLUTION INTRAVENOUS at 11:40

## 2018-01-19 RX ADMIN — POTASSIUM PHOSPHATE, MONOBASIC POTASSIUM PHOSPHATE, DIBASIC 62.5 MILLIMOLE(S): 236; 224 INJECTION, SOLUTION INTRAVENOUS at 12:20

## 2018-01-19 RX ADMIN — ENOXAPARIN SODIUM 50 MILLIGRAM(S): 100 INJECTION SUBCUTANEOUS at 18:15

## 2018-01-19 RX ADMIN — Medication 50 GRAM(S): at 10:13

## 2018-01-19 RX ADMIN — SODIUM CHLORIDE 90 MILLILITER(S): 9 INJECTION, SOLUTION INTRAVENOUS at 00:15

## 2018-01-19 RX ADMIN — PIPERACILLIN AND TAZOBACTAM 200 GRAM(S): 4; .5 INJECTION, POWDER, LYOPHILIZED, FOR SOLUTION INTRAVENOUS at 18:14

## 2018-01-19 RX ADMIN — SODIUM CHLORIDE 90 MILLILITER(S): 9 INJECTION, SOLUTION INTRAVENOUS at 18:18

## 2018-01-19 RX ADMIN — Medication 1 TABLET(S): at 07:10

## 2018-01-19 RX ADMIN — Medication 650 MILLIGRAM(S): at 23:25

## 2018-01-19 RX ADMIN — PIPERACILLIN AND TAZOBACTAM 200 GRAM(S): 4; .5 INJECTION, POWDER, LYOPHILIZED, FOR SOLUTION INTRAVENOUS at 05:41

## 2018-01-19 RX ADMIN — PANTOPRAZOLE SODIUM 40 MILLIGRAM(S): 20 TABLET, DELAYED RELEASE ORAL at 10:13

## 2018-01-19 RX ADMIN — PIPERACILLIN AND TAZOBACTAM 200 GRAM(S): 4; .5 INJECTION, POWDER, LYOPHILIZED, FOR SOLUTION INTRAVENOUS at 23:26

## 2018-01-19 RX ADMIN — Medication 20 MILLIEQUIVALENT(S): at 10:14

## 2018-01-19 NOTE — PROGRESS NOTE ADULT - ASSESSMENT
27y Female with SBO/intussusception now s/p Diagnostic laparoscopy, small bowel resection, ileocecectomy 1/12, currently with MVT on Lovenox     Pain/nausea control - IV tylenol and toradol PRN  Diet : CLD  DVT ppx: SCDS/Lovenox 50mg BID  IV Zosyn (1/17-)  F/u path report  AM labs (total pRBC transfusion : 1Unit)  Iron sucrose injection (1/10-1/15)   Dispo plan: Home with iron supplement 27y Female with SBO/intussusception now s/p Diagnostic laparoscopy, small bowel resection, ileocecectomy 1/12, currently with MVT on Lovenox     Pain/nausea control - IV tylenol and toradol PRN  Diet : CLD  DVT ppx: SCDS/Lovenox 50mg BID  IV Zosyn (1/17-)  F/u Peutz Jeg test  AM labs (total pRBC transfusion : 1Unit)  Iron sucrose injection (1/10-1/15)   Dispo plan: Home with iron supplement 27y Female with SBO/intussusception now s/p Diagnostic laparoscopy, small bowel resection, ileocecectomy 1/12, currently with MVT on Lovenox     Pain/nausea control - IV tylenol and toradol PRN  Diet : CLD, adv diet as tolerated  DVT ppx: SCDS/Lovenox 50mg BID  IV Zosyn (1/17-)  F/u Peutz Jeg test  AM labs (total pRBC transfusion : 1Unit)  Iron sucrose injection (1/10-1/15)   Dispo plan: Home with iron supplement  breast surgery consult, gyn onc consult

## 2018-01-19 NOTE — PROGRESS NOTE ADULT - SUBJECTIVE AND OBJECTIVE BOX
ON : Afebrile overnight. tolerating CLD, VSS  1/18: stepped down; advanced to sips of clears; genetic testing for Peutz-Jeghers collected; tolerating clears; +BM, oob/amb ON : Afebrile overnight. tolerating CLD, VSS  1/18: stepped down; advanced to sips of clears; genetic testing for Peutz-Jeghers collected; tolerating clears; +BM, oob/amb      POST OPERATIVE DAY #: 7    SUBJECTIVE:  pt seen at bedside, feeling better. tolerated CLD, VSS NAD    MEDICATIONS  (STANDING):  acetaminophen  IVPB. 700 milliGRAM(s) IV Intermittent once  dextrose 5% + sodium chloride 0.45%. 1000 milliLiter(s) (90 mL/Hr) IV Continuous <Continuous>  enoxaparin Injectable 50 milliGRAM(s) SubCutaneous every 12 hours  lidocaine 1%/EPINEPHrine 1:100,000 Inj 10 milliLiter(s) Local Injection once  pantoprazole  Injectable 40 milliGRAM(s) IV Push daily  piperacillin/tazobactam IVPB. 3.375 Gram(s) IV Intermittent every 6 hours    MEDICATIONS  (PRN):  ondansetron Injectable 4 milliGRAM(s) IV Push every 6 hours PRN Nausea      Vital Signs Last 24 Hrs  T(C): 38.1 (19 Jan 2018 08:24), Max: 38.1 (19 Jan 2018 08:24)  T(F): 100.6 (19 Jan 2018 08:24), Max: 100.6 (19 Jan 2018 08:24)  HR: 98 (19 Jan 2018 08:24) (93 - 102)  BP: 111/74 (19 Jan 2018 08:24) (103/67 - 115/79)  BP(mean): --  RR: 16 (19 Jan 2018 08:24) (16 - 17)  SpO2: 99% (19 Jan 2018 08:24) (99% - 100%)    PHYSICAL EXAM:      Constitutional: A&Ox3    Respiratory: non labored breathing, no respiratory distress    Cardiovascular: NSR, RRR    Gastrointestinal: soft, nondistended, incisional tenderness, small wound at inferior aspect of incision without active discharge or erythema, packed with iodoform and dresses with 4x4s      Extremities: (-) edema        I&O's Detail    18 Jan 2018 07:01  -  19 Jan 2018 07:00  --------------------------------------------------------  IN:    dextrose 5% + sodium chloride 0.45%.: 1260 mL    Oral Fluid: 360 mL    Solution: 100 mL  Total IN: 1720 mL    OUT:    Voided: 1600 mL  Total OUT: 1600 mL    Total NET: 120 mL      19 Jan 2018 07:01  -  19 Jan 2018 09:15  --------------------------------------------------------  IN:    dextrose 5% + sodium chloride 0.45%.: 180 mL  Total IN: 180 mL    OUT:  Total OUT: 0 mL    Total NET: 180 mL          LABS:                        8.2    10.8  )-----------( 829      ( 19 Jan 2018 07:25 )             28.1     01-19    137  |  102  |  2<L>  ----------------------------<  112<H>  3.8   |  25  |  0.64    Ca    8.6      19 Jan 2018 07:25  Phos  2.2     01-19  Mg     1.8     01-19

## 2018-01-19 NOTE — CONSULT NOTE ADULT - ASSESSMENT
28 yo F w/ PMH PCOS, irreg menses, intussusception s/p ileocecectomy 1/12, diagnosed w/ Peutz Jeghers' syndrome.  -Increased risk of ovarian, breast, and cervical cancer  -Endometrial thickening on ultrasound and CT w/ hx of irregular menses, previously on OCPs and Provera.   -Will need outpatient follow up. Gyn will follow while inpatient.    Discussed with Dr. Brar.

## 2018-01-19 NOTE — CONSULT NOTE ADULT - SUBJECTIVE AND OBJECTIVE BOX
26 yo G0 w/ hx of PCOS, irreg menses, multiple episodes of intussusception is s/p diagnostic laparoscopy, small bowel resection, ileocecectomy 1/12 c/b MVT on Lovenox. Patient now being worked up for Peutz Jeghers syndrome due to many hamartomatous polyps seen intraop. She has been have irregular bleeding over the last two weeks, but otherwise has no gyn complaints. Patient has a long history of irregular menses since age 10y, was previously diagnosed w/ PCOS and had a D&C for AUB w/ endometrial thickening 1 year ago. She was previously on OCPs for several years. She has a hx of HPV+ with her most recent pap smear 1 year ago and was normal, she sees a gyn annually. Has not been sexually active for last 1.5 years, previously with 1 male partner for 5 years.     Patient does not have any breast concerns; denies breast mass, discharge, skin changes. Has not had a mammogram. Has had 2 colonoscopies, last in 2017 in [place].    Obhx: G0  Gynhx: menarch at age 10, irreg menses, PCOS, HPV+ but last Pap 2017 normal, no hx of STIs  PMH: multiple episodes of intussussception, colonoscopy and endoscopy in 2017 at [facility], told she has polyps. Biopsy was taken and was told that it was a benign polyp.   PSH: sex-lap with SBR and appendectomy in outside hospital ([place]) in 2008, small bowel obstruction s/p diagnostic laparoscopic with lysis of adhesion in 2016.   Meds: none  NKDA  Famhx: negative for cancer  Social: no smoking hx, social drinker, no drug use       PAST MEDICAL & SURGICAL HISTORY:  Intestinal adhesions with obstruction, unspecified whether partial or complete: diagnostic lap with SANDRO  H/O exploratory laparotomy: with SBR and appendectomy      REVIEW OF SYSTEMS    neg     MEDICATIONS  (STANDING):  acetaminophen  IVPB. 700 milliGRAM(s) IV Intermittent once  dextrose 5% + sodium chloride 0.45%. 1000 milliLiter(s) (90 mL/Hr) IV Continuous <Continuous>  enoxaparin Injectable 50 milliGRAM(s) SubCutaneous every 12 hours  lidocaine 1%/EPINEPHrine 1:100,000 Inj 10 milliLiter(s) Local Injection once  pantoprazole  Injectable 40 milliGRAM(s) IV Push daily  piperacillin/tazobactam IVPB. 3.375 Gram(s) IV Intermittent every 6 hours    MEDICATIONS  (PRN):  ondansetron Injectable 4 milliGRAM(s) IV Push every 6 hours PRN Nausea      Allergies    No Known Allergies    Intolerances        Vital Signs Last 24 Hrs  T(C): 36.1 (19 Jan 2018 14:25), Max: 38.1 (19 Jan 2018 08:24)  T(F): 97 (19 Jan 2018 14:25), Max: 100.6 (19 Jan 2018 08:24)  HR: 77 (19 Jan 2018 14:25) (77 - 102)  < from: CT Abdomen and Pelvis w/ IV Cont (01.16.18 @ 16:55) >    BP: 92/60 (19 Jan 2018 14:25) (92/60 - 115/79)  BP(mean): --  RR: 16 (19 Jan 2018 14:25) (16 - 17)  SpO2: 99% (19 Jan 2018 14:25) (99% - 100%)    Gen: NAD, AOx3  Chest: normal work of breathing  Breast: symmetrical, no nipple discharge, no masses or skin abnormalites  Abdomen: soft, nontender, nondistended, no rebound or guarding  Incision: midline vertical incision w/ packing at inferior aspect, no active drainage, no erythema  Pelvic: Cervix long and closed w/ small dark red mass extruding from cervical os, no bleeding, normal vaginal discharge, no CMT or adnexal masses  Extremities: WWP    LABS:                        8.2    10.8  )-----------( 829      ( 19 Jan 2018 07:25 )             28.1     01-19    137  |  102  |  2<L>  ----------------------------<  112<H>  3.8   |  25  |  0.64    Ca    8.6      19 Jan 2018 07:25  Phos  2.2     01-19  Mg     1.8     01-19            RADIOLOGY & ADDITIONAL STUDIES:  < from: CT Abdomen and Pelvis w/ IV Cont (01.16.18 @ 16:55) >  EXAM:  CT ABDOMEN AND PELVIS IC                          PROCEDURE DATE:  01/16/2018    Quantity of Contrast in Vial in ml: 100 Contrast Used: Optiray 350  Quantity of Contrast Wasted in ml: 3           INTERPRETATION:  CT of the abdomen and pelviswith contrast dated   1/16/2018 4:55 PM    INDICATION: Fever    TECHNIQUE: CT of abdomen and pelvis was performed using intravenous   contrast. Axial, sagittal and coronal images were produced and reviewed.    PRIOR STUDIES: 1/12/2018 and 1/8/2018    FINDINGS: Images of the lower chest demonstrate small bilateral pleural   effusions. Mild dependent atelectasis is seen in the lung bases.    The liver is normal in appearance.  No radiopaque stones are seen in the   gallbladder.  The pancreas is normal in appearance.  No splenic   abnormalities are seen.     Portal vein is patent. Splenic vein is patent. There is a filling defect   in a relatively large ileal branch of the superior mesenteric vein.    The adrenal glands are unremarkable. The kidneys are normal in   appearance.        No abdominal aortic aneurysm is seen. Mildly enlarged mesenteric lymph   nodes are present. No retroperitoneal lymphadenopathy is seen.    Evaluation of the bowel is limited without oral contrast. Small amount of   gas is seen in the subcutis tissues of the abdominal wall consistent with   recent surgery. Small pneumoperitoneum is noted. There is a small to   moderate ascites. There has been interval resection and anastomosis of   previously into separate small bowel loop in the left lower quadrant.   Another anastomosis is noted at the ileocecal valve. Stomach and proximal   small bowel loops are not dilated. Dilated distal loops of small bowel   are noted starting at the level of anastomosis. Distal ileal loops   demonstrate wall thickening in the pelvis. Right colon is mildly   distended with fluid. Rectum is moderately distended with gas. No   expansile focal fluid collection is identified at this time.    Images of the pelvis again demonstrates a thickened endometrial cavity.   No adnexal mass is seen. Bladder is unremarkable.    Evaluation of the osseous structures demonstrates incomplete fusion of   posterior elements of L5.      IMPRESSION:    Interval resection of the ileal mass and small bowel anastomosis.   Separate anastomosis at the ileocecal valve.    Dilated distal loops of small bowel be due to ileus or early obstruction.   Proximal small bowel loops are not dilated.    Small to moderate ascites. Small pneumoperitoneum. Cannotexclude   anastomotic leak without oral contrast.    Thrombus in ileal branch of the inferior mesenteric vein.    Wall thickening of distal small bowel loops could be due to postoperative   edema, venous thrombosis or peritonitis.            "Thank you for the opportunity to participate in the care of this   patient."        VINCE GUTIERREZ M.D., ATTENDING RADIOLOGIST  This document has been electronically signed. Jan 16 2018  5:56PM                  < end of copied text >    < from: US Transvaginal (01.08.18 @ 15:35) >  EXAM:  US TRANSVAGINAL                           PROCEDURE DATE:  01/08/2018                     INTERPRETATION:    PELVIC ULTRASOUND dated 1/8/2018 3:35 PM    INDICATION: 27-year-old female with Vaginal bleeding. History of D&C and   anemia. Upperand lower abdominal pain for 3 days. Beta hCG negative. No   history of hormone therapy. LMP: 12/23/2017    TECHNIQUE: Transabdominal views of the pelvis were obtained followed by   transvaginal views for better visualization of the ovaries.      PRIOR STUDIES: None    The study is interpreted with a subsequently performed CT abdomen pelvis   exam.    FINDINGS:   These images demonstrate the uterus to be anteverted. The uterus is   normal in size and shape.  The uterus is 7.2 x 3.6 x 5.4 cm.  No   myometrial abnormalities are seen.  Several (less than 5) variable sized   simple appearing nabothian cysts are noted, the largest measuring up to   1.5 cm.  The endometrium is diffusely thickened, measuring up to 2.2 cm ,   with multiple internal tiny cystic components. These findings are most   suggestive of endometrial hyperplasia.    The right ovary is normal in size, measuring up to 2.5 x 1.7 x 2.9 cm.   the left ovary is also normal in size, measuring up to 2.6 x 2.3 x 2.0   cm. there are echogenic foci within both ovaries, which are nonspecific.   Doppler evaluation demonstrates normal vascularity and spectral waveforms   to both ovaries, without evidence of torsion.    Physiologic amount of free fluid is seen in the cul-de-sac.      IMPRESSION:   1.  Diffusely thickened endometrium, most suggestive of endometrial   hyperplasia.    2.  Normal sized bilateral ovaries with innumerable echogenic foci, which   is a nonspecific finding.    3.  Physiologic amount of free fluid in the cul-de-sac.       RECOMMENDATION:  A follow-up transvaginal pelvic ultrasound exam is recommended in one   year, to reevaluate both ovaries.                  "Thank you for the opportunity to participate in the care of this   patient."    MERRY HALEY M.D., RADIOLOGY RESIDENT  This document has been electronically signed.  CHRISTAL RAIN M.D., ATTENDING RADIOLOGIST  This document has been electronically signed.  Jan 8 2018  4:54PM                  < end of copied text >

## 2018-01-20 LAB
-  CEFTRIAXONE: 0.25 — SIGNIFICANT CHANGE UP
-  CLINDAMYCIN: SIGNIFICANT CHANGE UP
-  ERYTHROMYCIN: SIGNIFICANT CHANGE UP
-  PENICILLIN: SIGNIFICANT CHANGE UP
-  VANCOMYCIN: SIGNIFICANT CHANGE UP
ANION GAP SERPL CALC-SCNC: 18 MMOL/L — HIGH (ref 5–17)
BUN SERPL-MCNC: 1 MG/DL — LOW (ref 7–23)
CALCIUM SERPL-MCNC: 8.5 MG/DL — SIGNIFICANT CHANGE UP (ref 8.4–10.5)
CHLORIDE SERPL-SCNC: 99 MMOL/L — SIGNIFICANT CHANGE UP (ref 96–108)
CO2 SERPL-SCNC: 25 MMOL/L — SIGNIFICANT CHANGE UP (ref 22–31)
CREAT SERPL-MCNC: 0.64 MG/DL — SIGNIFICANT CHANGE UP (ref 0.5–1.3)
CULTURE RESULTS: SIGNIFICANT CHANGE UP
CULTURE RESULTS: SIGNIFICANT CHANGE UP
GLUCOSE SERPL-MCNC: 110 MG/DL — HIGH (ref 70–99)
HCT VFR BLD CALC: 28.5 % — LOW (ref 34.5–45)
HGB BLD-MCNC: 8.3 G/DL — LOW (ref 11.5–15.5)
MAGNESIUM SERPL-MCNC: 2 MG/DL — SIGNIFICANT CHANGE UP (ref 1.6–2.6)
MCHC RBC-ENTMCNC: 22.2 PG — LOW (ref 27–34)
MCHC RBC-ENTMCNC: 29.1 G/DL — LOW (ref 32–36)
MCV RBC AUTO: 76.2 FL — LOW (ref 80–100)
METHOD TYPE: SIGNIFICANT CHANGE UP
METHOD TYPE: SIGNIFICANT CHANGE UP
ORGANISM # SPEC MICROSCOPIC CNT: SIGNIFICANT CHANGE UP
PHOSPHATE SERPL-MCNC: 1.8 MG/DL — LOW (ref 2.5–4.5)
PLATELET # BLD AUTO: 901 K/UL — HIGH (ref 150–400)
POTASSIUM SERPL-MCNC: 4.3 MMOL/L — SIGNIFICANT CHANGE UP (ref 3.5–5.3)
POTASSIUM SERPL-SCNC: 4.3 MMOL/L — SIGNIFICANT CHANGE UP (ref 3.5–5.3)
RBC # BLD: 3.74 M/UL — LOW (ref 3.8–5.2)
RBC # FLD: 30.4 % — HIGH (ref 10.3–16.9)
SODIUM SERPL-SCNC: 142 MMOL/L — SIGNIFICANT CHANGE UP (ref 135–145)
SPECIMEN SOURCE: SIGNIFICANT CHANGE UP
SPECIMEN SOURCE: SIGNIFICANT CHANGE UP
WBC # BLD: 10.5 K/UL — SIGNIFICANT CHANGE UP (ref 3.8–10.5)
WBC # FLD AUTO: 10.5 K/UL — SIGNIFICANT CHANGE UP (ref 3.8–10.5)

## 2018-01-20 RX ORDER — FAMOTIDINE 10 MG/ML
20 INJECTION INTRAVENOUS DAILY
Qty: 0 | Refills: 0 | Status: DISCONTINUED | OUTPATIENT
Start: 2018-01-20 | End: 2018-01-26

## 2018-01-20 RX ORDER — POTASSIUM PHOSPHATE, MONOBASIC POTASSIUM PHOSPHATE, DIBASIC 236; 224 MG/ML; MG/ML
15 INJECTION, SOLUTION INTRAVENOUS ONCE
Qty: 0 | Refills: 0 | Status: COMPLETED | OUTPATIENT
Start: 2018-01-20 | End: 2018-01-20

## 2018-01-20 RX ADMIN — Medication 650 MILLIGRAM(S): at 11:43

## 2018-01-20 RX ADMIN — SODIUM CHLORIDE 90 MILLILITER(S): 9 INJECTION, SOLUTION INTRAVENOUS at 05:49

## 2018-01-20 RX ADMIN — PIPERACILLIN AND TAZOBACTAM 200 GRAM(S): 4; .5 INJECTION, POWDER, LYOPHILIZED, FOR SOLUTION INTRAVENOUS at 17:35

## 2018-01-20 RX ADMIN — ENOXAPARIN SODIUM 50 MILLIGRAM(S): 100 INJECTION SUBCUTANEOUS at 05:49

## 2018-01-20 RX ADMIN — ENOXAPARIN SODIUM 50 MILLIGRAM(S): 100 INJECTION SUBCUTANEOUS at 17:35

## 2018-01-20 RX ADMIN — FAMOTIDINE 20 MILLIGRAM(S): 10 INJECTION INTRAVENOUS at 14:07

## 2018-01-20 RX ADMIN — PIPERACILLIN AND TAZOBACTAM 200 GRAM(S): 4; .5 INJECTION, POWDER, LYOPHILIZED, FOR SOLUTION INTRAVENOUS at 11:42

## 2018-01-20 RX ADMIN — PIPERACILLIN AND TAZOBACTAM 200 GRAM(S): 4; .5 INJECTION, POWDER, LYOPHILIZED, FOR SOLUTION INTRAVENOUS at 05:49

## 2018-01-20 RX ADMIN — POTASSIUM PHOSPHATE, MONOBASIC POTASSIUM PHOSPHATE, DIBASIC 62.5 MILLIMOLE(S): 236; 224 INJECTION, SOLUTION INTRAVENOUS at 12:20

## 2018-01-20 NOTE — PROGRESS NOTE ADULT - ASSESSMENT
27y Female with SBO/intussusception now s/p Diagnostic laparoscopy, small bowel resection, ileocecectomy 1/12, currently with MVT on Lovenox     Pain/nausea control - IV tylenol and toradol PRN  Diet : FLD  DVT ppx: SCDS/Lovenox 50mg BID  IV Zosyn (1/17-)  AM labs (total pRBC transfusion : 1Unit)  Iron sucrose injection (1/10-1/15)   Dispo plan: Home with iron supplement

## 2018-01-20 NOTE — PROGRESS NOTE ADULT - SUBJECTIVE AND OBJECTIVE BOX
Patient seen and evaluated at bedside. Patient reports pain is well controlled. She is tolerated full liquid diet. She is ambulating and voiding spontaneously. She reports continued vaginal spotting. She reports chills but denies fever.     Vital Signs Last 24 Hrs  T(C): 37.9 (20 Jan 2018 08:07), Max: 38.2 (19 Jan 2018 21:00)  T(F): 100.3 (20 Jan 2018 08:07), Max: 100.7 (19 Jan 2018 21:00)  HR: 95 (20 Jan 2018 08:07) (77 - 101)  BP: 100/59 (20 Jan 2018 08:07) (92/60 - 106/72)  BP(mean): --  RR: 16 (20 Jan 2018 08:07) (16 - 18)  SpO2: 100% (20 Jan 2018 08:07) (96% - 100%) Patient seen and evaluated at bedside. Patient reports pain is well controlled. She is tolerated full liquid diet. She is ambulating and voiding spontaneously. She reports continued vaginal spotting. She reports chills but denies fever.     Vital Signs Last 24 Hrs  T(C): 37.9 (20 Jan 2018 08:07), Max: 38.2 (19 Jan 2018 21:00)  T(F): 100.3 (20 Jan 2018 08:07), Max: 100.7 (19 Jan 2018 21:00)  HR: 95 (20 Jan 2018 08:07) (77 - 101)  BP: 100/59 (20 Jan 2018 08:07) (92/60 - 106/72)  BP(mean): --  RR: 16 (20 Jan 2018 08:07) (16 - 18)  SpO2: 100% (20 Jan 2018 08:07) (96% - 100%)  Gen: NAD  Card: RRR no m/r/g  Pulm: CTAB no crackles or wheezes  Abd: +BS, mildly distended, nontender, no rebound or guarding, incision c/d/i, wound partially open covered in guaze  Ext: no calf tenderness bilaterally    MEDICATIONS  (STANDING):  acetaminophen  IVPB. 700 milliGRAM(s) IV Intermittent once  enoxaparin Injectable 50 milliGRAM(s) SubCutaneous every 12 hours  famotidine    Tablet 20 milliGRAM(s) Oral daily  lidocaine 1%/EPINEPHrine 1:100,000 Inj 10 milliLiter(s) Local Injection once  piperacillin/tazobactam IVPB. 3.375 Gram(s) IV Intermittent every 6 hours    MEDICATIONS  (PRN):  acetaminophen   Tablet 650 milliGRAM(s) Oral every 6 hours PRN For Temp greater than 38 C (100.4 F)  ondansetron Injectable 4 milliGRAM(s) IV Push every 6 hours PRN Nausea

## 2018-01-20 NOTE — PROGRESS NOTE ADULT - ASSESSMENT
26 yo F w/ PMH PCOS, irreg menses, intussusception s/p ileocecectomy 1/12, diagnosed w/ Peutz Jeghers' syndrome. Patient c/o irregular vaginal spotting in setting of endometrial thickening on US and CT. given dx of Peutz Jegher's syndrome, patient at increased risk for endometrial neoplasia. Patient will need outpatient follow up with Gyn/Onc.   -Patient given Dr. Brar's information to follow up outpatient. We will schedule her an appointment with Dr. Brar in 1-2 months for follow up.   -Gyn/onc to sign off. Feel free to consult Gyn/onc again if our services are needed    Plan discussed with Dr. Brar

## 2018-01-20 NOTE — PROGRESS NOTE ADULT - SUBJECTIVE AND OBJECTIVE BOX
ON : Tolerating well wth FLD, spiked low grade fever to 100.7, tylenol given.   1/19: lower portion of wound opened, some purulent discharge , breast sx and gyn onc consult, adv diet to full liquid. Tolerating diet, continued diarrhea. ON : Tolerating well wth FLD, spiked low grade fever to 100.7, tylenol given.   1/19: lower portion of wound opened, some purulent discharge , breast sx and gyn onc consult, adv diet to full liquid. Tolerating diet, continued diarrhea.    STATUS POST:  1/12: Diagnositic lap, SBR with ileocecectomy.      SUBJECTIVE: Patient seen and examined bedside by chief resident. Bcx no growth after 4 days. Surgical swab Cx growing K pneumo, S bovis, Prevotella. All sensitive to Zosyn (and Unasyn). GYN Onc recommending outpt follow up with their clinic. They will schedule her an appointment with Dr. Obregon in 1-2 months.    enoxaparin Injectable 50 milliGRAM(s) SubCutaneous every 12 hours  piperacillin/tazobactam IVPB. 3.375 Gram(s) IV Intermittent every 6 hours      Vital Signs Last 24 Hrs  T(C): 37.4 (20 Jan 2018 16:39), Max: 38.2 (19 Jan 2018 21:00)  T(F): 99.3 (20 Jan 2018 16:39), Max: 100.7 (19 Jan 2018 21:00)  HR: 75 (20 Jan 2018 16:39) (75 - 101)  BP: 126/82 (20 Jan 2018 16:39) (98/65 - 126/82)  BP(mean): --  RR: 16 (20 Jan 2018 16:39) (16 - 18)  SpO2: 99% (20 Jan 2018 16:39) (99% - 100%)  I&O's Detail    19 Jan 2018 07:01  -  20 Jan 2018 07:00  --------------------------------------------------------  IN:    dextrose 5% + sodium chloride 0.45%.: 405 mL    Oral Fluid: 810 mL    Solution: 970 mL    Solution: 50 mL    Solution: 100 mL  Total IN: 2335 mL    OUT:    Voided: 2600 mL  Total OUT: 2600 mL    Total NET: -265 mL      20 Jan 2018 07:01  -  20 Jan 2018 18:51  --------------------------------------------------------  IN:    dextrose 5% + sodium chloride 0.45%.: 270 mL    Solution: 250 mL    Solution: 200 mL  Total IN: 720 mL    OUT:    Voided: 400 mL  Total OUT: 400 mL    Total NET: 320 mL          General: NAD, resting comfortably in bed  C/V: NSR  Pulm: Nonlabored breathing, no respiratory distress  Abd: soft, NT/ND.  Extrem: WWP, no edema, SCDs in place        LABS:                        8.3    10.5  )-----------( 901      ( 20 Jan 2018 07:15 )             28.5     01-20    142  |  99  |  1<L>  ----------------------------<  110<H>  4.3   |  25  |  0.64    Ca    8.5      20 Jan 2018 07:15  Phos  1.8     01-20  Mg     2.0     01-20            RADIOLOGY & ADDITIONAL STUDIES:

## 2018-01-21 LAB
ANION GAP SERPL CALC-SCNC: 14 MMOL/L — SIGNIFICANT CHANGE UP (ref 5–17)
ANION GAP SERPL CALC-SCNC: 14 MMOL/L — SIGNIFICANT CHANGE UP (ref 5–17)
BUN SERPL-MCNC: 5 MG/DL — LOW (ref 7–23)
BUN SERPL-MCNC: 7 MG/DL — SIGNIFICANT CHANGE UP (ref 7–23)
CALCIUM SERPL-MCNC: 10.1 MG/DL — SIGNIFICANT CHANGE UP (ref 8.4–10.5)
CALCIUM SERPL-MCNC: 9.7 MG/DL — SIGNIFICANT CHANGE UP (ref 8.4–10.5)
CHLORIDE SERPL-SCNC: 98 MMOL/L — SIGNIFICANT CHANGE UP (ref 96–108)
CHLORIDE SERPL-SCNC: 99 MMOL/L — SIGNIFICANT CHANGE UP (ref 96–108)
CO2 SERPL-SCNC: 25 MMOL/L — SIGNIFICANT CHANGE UP (ref 22–31)
CO2 SERPL-SCNC: 26 MMOL/L — SIGNIFICANT CHANGE UP (ref 22–31)
CREAT SERPL-MCNC: 0.66 MG/DL — SIGNIFICANT CHANGE UP (ref 0.5–1.3)
CREAT SERPL-MCNC: 0.75 MG/DL — SIGNIFICANT CHANGE UP (ref 0.5–1.3)
CULTURE RESULTS: SIGNIFICANT CHANGE UP
CULTURE RESULTS: SIGNIFICANT CHANGE UP
GLUCOSE SERPL-MCNC: 117 MG/DL — HIGH (ref 70–99)
GLUCOSE SERPL-MCNC: 85 MG/DL — SIGNIFICANT CHANGE UP (ref 70–99)
HCT VFR BLD CALC: 33.7 % — LOW (ref 34.5–45)
HGB BLD-MCNC: 9.7 G/DL — LOW (ref 11.5–15.5)
MAGNESIUM SERPL-MCNC: 2 MG/DL — SIGNIFICANT CHANGE UP (ref 1.6–2.6)
MCHC RBC-ENTMCNC: 22.5 PG — LOW (ref 27–34)
MCHC RBC-ENTMCNC: 28.8 G/DL — LOW (ref 32–36)
MCV RBC AUTO: 78.2 FL — LOW (ref 80–100)
PHOSPHATE SERPL-MCNC: 2.6 MG/DL — SIGNIFICANT CHANGE UP (ref 2.5–4.5)
PLATELET # BLD AUTO: 1110 K/UL — CRITICAL HIGH (ref 150–400)
POTASSIUM SERPL-MCNC: 4.8 MMOL/L — SIGNIFICANT CHANGE UP (ref 3.5–5.3)
POTASSIUM SERPL-MCNC: 5.6 MMOL/L — HIGH (ref 3.5–5.3)
POTASSIUM SERPL-SCNC: 4.8 MMOL/L — SIGNIFICANT CHANGE UP (ref 3.5–5.3)
POTASSIUM SERPL-SCNC: 5.6 MMOL/L — HIGH (ref 3.5–5.3)
RBC # BLD: 4.31 M/UL — SIGNIFICANT CHANGE UP (ref 3.8–5.2)
RBC # FLD: 29.6 % — HIGH (ref 10.3–16.9)
SODIUM SERPL-SCNC: 138 MMOL/L — SIGNIFICANT CHANGE UP (ref 135–145)
SODIUM SERPL-SCNC: 138 MMOL/L — SIGNIFICANT CHANGE UP (ref 135–145)
SPECIMEN SOURCE: SIGNIFICANT CHANGE UP
SPECIMEN SOURCE: SIGNIFICANT CHANGE UP
WBC # BLD: 11.7 K/UL — HIGH (ref 3.8–10.5)
WBC # FLD AUTO: 11.7 K/UL — HIGH (ref 3.8–10.5)

## 2018-01-21 PROCEDURE — 74022 RADEX COMPL AQT ABD SERIES: CPT | Mod: 26

## 2018-01-21 RX ORDER — KETOROLAC TROMETHAMINE 30 MG/ML
10 SYRINGE (ML) INJECTION ONCE
Qty: 0 | Refills: 0 | Status: DISCONTINUED | OUTPATIENT
Start: 2018-01-21 | End: 2018-01-21

## 2018-01-21 RX ORDER — HYDROMORPHONE HYDROCHLORIDE 2 MG/ML
0.5 INJECTION INTRAMUSCULAR; INTRAVENOUS; SUBCUTANEOUS ONCE
Qty: 0 | Refills: 0 | Status: DISCONTINUED | OUTPATIENT
Start: 2018-01-21 | End: 2018-01-21

## 2018-01-21 RX ORDER — SODIUM POLYSTYRENE SULFONATE 4.1 MEQ/G
30 POWDER, FOR SUSPENSION ORAL ONCE
Qty: 0 | Refills: 0 | Status: DISCONTINUED | OUTPATIENT
Start: 2018-01-21 | End: 2018-01-21

## 2018-01-21 RX ORDER — CALCIUM GLUCONATE 100 MG/ML
1 VIAL (ML) INTRAVENOUS ONCE
Qty: 0 | Refills: 0 | Status: COMPLETED | OUTPATIENT
Start: 2018-01-21 | End: 2018-01-21

## 2018-01-21 RX ORDER — HYDROMORPHONE HYDROCHLORIDE 2 MG/ML
1 INJECTION INTRAMUSCULAR; INTRAVENOUS; SUBCUTANEOUS ONCE
Qty: 0 | Refills: 0 | Status: DISCONTINUED | OUTPATIENT
Start: 2018-01-21 | End: 2018-01-21

## 2018-01-21 RX ADMIN — PIPERACILLIN AND TAZOBACTAM 200 GRAM(S): 4; .5 INJECTION, POWDER, LYOPHILIZED, FOR SOLUTION INTRAVENOUS at 18:00

## 2018-01-21 RX ADMIN — ENOXAPARIN SODIUM 50 MILLIGRAM(S): 100 INJECTION SUBCUTANEOUS at 05:42

## 2018-01-21 RX ADMIN — PIPERACILLIN AND TAZOBACTAM 200 GRAM(S): 4; .5 INJECTION, POWDER, LYOPHILIZED, FOR SOLUTION INTRAVENOUS at 00:11

## 2018-01-21 RX ADMIN — PIPERACILLIN AND TAZOBACTAM 200 GRAM(S): 4; .5 INJECTION, POWDER, LYOPHILIZED, FOR SOLUTION INTRAVENOUS at 23:45

## 2018-01-21 RX ADMIN — HYDROMORPHONE HYDROCHLORIDE 0.5 MILLIGRAM(S): 2 INJECTION INTRAMUSCULAR; INTRAVENOUS; SUBCUTANEOUS at 14:55

## 2018-01-21 RX ADMIN — ENOXAPARIN SODIUM 50 MILLIGRAM(S): 100 INJECTION SUBCUTANEOUS at 18:00

## 2018-01-21 RX ADMIN — HYDROMORPHONE HYDROCHLORIDE 0.5 MILLIGRAM(S): 2 INJECTION INTRAMUSCULAR; INTRAVENOUS; SUBCUTANEOUS at 14:40

## 2018-01-21 RX ADMIN — FAMOTIDINE 20 MILLIGRAM(S): 10 INJECTION INTRAVENOUS at 12:07

## 2018-01-21 RX ADMIN — PIPERACILLIN AND TAZOBACTAM 200 GRAM(S): 4; .5 INJECTION, POWDER, LYOPHILIZED, FOR SOLUTION INTRAVENOUS at 05:42

## 2018-01-21 RX ADMIN — HYDROMORPHONE HYDROCHLORIDE 1 MILLIGRAM(S): 2 INJECTION INTRAMUSCULAR; INTRAVENOUS; SUBCUTANEOUS at 08:14

## 2018-01-21 RX ADMIN — PIPERACILLIN AND TAZOBACTAM 200 GRAM(S): 4; .5 INJECTION, POWDER, LYOPHILIZED, FOR SOLUTION INTRAVENOUS at 12:07

## 2018-01-21 RX ADMIN — Medication 10 MILLIGRAM(S): at 23:35

## 2018-01-21 RX ADMIN — Medication 200 GRAM(S): at 14:20

## 2018-01-21 RX ADMIN — HYDROMORPHONE HYDROCHLORIDE 1 MILLIGRAM(S): 2 INJECTION INTRAMUSCULAR; INTRAVENOUS; SUBCUTANEOUS at 07:55

## 2018-01-21 NOTE — PROGRESS NOTE ADULT - ATTENDING COMMENTS
pt seen examined    abdom stable   non acute but somewhat distended  with mild / mod tenderness   wound packed +/- clean    olesya liqs    encourage ambulation    AXR    disc'd Res
pt seen examined  stable olesya diet BUT states feels pain sometimes as food moves down -  pain then resolves with BM  Abdom softly distended  small midline opning - packed  Cont LWC  await normalm bowel fct
Patient seen and examined at bedside.  Feeling better.  Pain resolved.  Abdomen soft, ND.  Much improved tenderness in lower abdomen, no rebound or guarding.  Labs reviewed, platelet count and H/H low.  Hold off on transfusion for now.  Hematology consult  GYN consult  GI consult

## 2018-01-21 NOTE — PROGRESS NOTE ADULT - SUBJECTIVE AND OBJECTIVE BOX
1/20 day/ovn: Bcx no growth after 4 days. Surgical swab Cx growing K pneumo, S bovis, Prevotella. All sensitive to Zosyn (and Unasyn). GYN Onc recommending outpt follow up with their clinic. They will schedule her an appointment with Dr. Obregon in 1-2 months.       SUBJECTIVE: Patient seen and examined bedside by chief resident. Feels less abdominal pain, and less distention.     enoxaparin Injectable 50 milliGRAM(s) SubCutaneous every 12 hours  piperacillin/tazobactam IVPB. 3.375 Gram(s) IV Intermittent every 6 hours      Vital Signs Last 24 Hrs  T(C): 37.4 (21 Jan 2018 05:45), Max: 37.9 (20 Jan 2018 08:07)  T(F): 99.4 (21 Jan 2018 05:45), Max: 100.3 (20 Jan 2018 08:07)  HR: 87 (21 Jan 2018 05:45) (75 - 95)  BP: 112/79 (21 Jan 2018 05:45) (100/59 - 126/82)  BP(mean): --  RR: 16 (21 Jan 2018 05:45) (16 - 16)  SpO2: 100% (21 Jan 2018 05:45) (99% - 100%)  I&O's Detail    20 Jan 2018 07:01  -  21 Jan 2018 07:00  --------------------------------------------------------  IN:    dextrose 5% + sodium chloride 0.45%.: 270 mL    Oral Fluid: 120 mL    Solution: 250 mL    Solution: 200 mL  Total IN: 840 mL    OUT:    Voided: 900 mL  Total OUT: 900 mL    Total NET: -60 mL          General: NAD, resting comfortably in bed  C/V: NSR  Pulm: Nonlabored breathing, no respiratory distress  Abd: soft, NT/ND. Packing changed.   Extrem: WWP, no edema, SCDs in place        LABS:                        8.3    10.5  )-----------( 901      ( 20 Jan 2018 07:15 )             28.5     01-20    142  |  99  |  1<L>  ----------------------------<  110<H>  4.3   |  25  |  0.64    Ca    8.5      20 Jan 2018 07:15  Phos  1.8     01-20  Mg     2.0     01-20            RADIOLOGY & ADDITIONAL STUDIES:

## 2018-01-22 LAB
ANION GAP SERPL CALC-SCNC: 15 MMOL/L — SIGNIFICANT CHANGE UP (ref 5–17)
APPEARANCE UR: CLEAR — SIGNIFICANT CHANGE UP
BILIRUB UR-MCNC: NEGATIVE — SIGNIFICANT CHANGE UP
BUN SERPL-MCNC: 7 MG/DL — SIGNIFICANT CHANGE UP (ref 7–23)
CALCIUM SERPL-MCNC: 10.1 MG/DL — SIGNIFICANT CHANGE UP (ref 8.4–10.5)
CHLORIDE SERPL-SCNC: 96 MMOL/L — SIGNIFICANT CHANGE UP (ref 96–108)
CO2 SERPL-SCNC: 27 MMOL/L — SIGNIFICANT CHANGE UP (ref 22–31)
COLOR SPEC: YELLOW — SIGNIFICANT CHANGE UP
CREAT SERPL-MCNC: 0.74 MG/DL — SIGNIFICANT CHANGE UP (ref 0.5–1.3)
DIFF PNL FLD: (no result)
GLUCOSE SERPL-MCNC: 97 MG/DL — SIGNIFICANT CHANGE UP (ref 70–99)
GLUCOSE UR QL: NEGATIVE — SIGNIFICANT CHANGE UP
HCT VFR BLD CALC: 34.5 % — SIGNIFICANT CHANGE UP (ref 34.5–45)
HGB BLD-MCNC: 10.3 G/DL — LOW (ref 11.5–15.5)
KETONES UR-MCNC: >=80 MG/DL
LEUKOCYTE ESTERASE UR-ACNC: (no result)
MAGNESIUM SERPL-MCNC: 1.8 MG/DL — SIGNIFICANT CHANGE UP (ref 1.6–2.6)
MCHC RBC-ENTMCNC: 22.5 PG — LOW (ref 27–34)
MCHC RBC-ENTMCNC: 29.9 G/DL — LOW (ref 32–36)
MCV RBC AUTO: 75.5 FL — LOW (ref 80–100)
NITRITE UR-MCNC: NEGATIVE — SIGNIFICANT CHANGE UP
PH UR: 6.5 — SIGNIFICANT CHANGE UP (ref 5–8)
PHOSPHATE SERPL-MCNC: 2.6 MG/DL — SIGNIFICANT CHANGE UP (ref 2.5–4.5)
PLATELET # BLD AUTO: 1118 K/UL — CRITICAL HIGH (ref 150–400)
POTASSIUM SERPL-MCNC: 5.1 MMOL/L — SIGNIFICANT CHANGE UP (ref 3.5–5.3)
POTASSIUM SERPL-SCNC: 5.1 MMOL/L — SIGNIFICANT CHANGE UP (ref 3.5–5.3)
PROT UR-MCNC: (no result) MG/DL
RBC # BLD: 4.57 M/UL — SIGNIFICANT CHANGE UP (ref 3.8–5.2)
SODIUM SERPL-SCNC: 138 MMOL/L — SIGNIFICANT CHANGE UP (ref 135–145)
SP GR SPEC: 1.01 — SIGNIFICANT CHANGE UP (ref 1–1.03)
UROBILINOGEN FLD QL: 1 E.U./DL — SIGNIFICANT CHANGE UP
WBC # BLD: 12.9 K/UL — HIGH (ref 3.8–10.5)
WBC # FLD AUTO: 12.9 K/UL — HIGH (ref 3.8–10.5)

## 2018-01-22 RX ORDER — ACETAMINOPHEN 500 MG
1000 TABLET ORAL ONCE
Qty: 0 | Refills: 0 | Status: DISCONTINUED | OUTPATIENT
Start: 2018-01-22 | End: 2018-01-22

## 2018-01-22 RX ORDER — ACETAMINOPHEN 500 MG
650 TABLET ORAL EVERY 6 HOURS
Qty: 0 | Refills: 0 | Status: DISCONTINUED | OUTPATIENT
Start: 2018-01-22 | End: 2018-01-26

## 2018-01-22 RX ORDER — ACETAMINOPHEN 500 MG
750 TABLET ORAL ONCE
Qty: 0 | Refills: 0 | Status: COMPLETED | OUTPATIENT
Start: 2018-01-22 | End: 2018-01-22

## 2018-01-22 RX ADMIN — Medication 750 MILLIGRAM(S): at 13:00

## 2018-01-22 RX ADMIN — Medication 650 MILLIGRAM(S): at 19:30

## 2018-01-22 RX ADMIN — ENOXAPARIN SODIUM 50 MILLIGRAM(S): 100 INJECTION SUBCUTANEOUS at 17:55

## 2018-01-22 RX ADMIN — PIPERACILLIN AND TAZOBACTAM 200 GRAM(S): 4; .5 INJECTION, POWDER, LYOPHILIZED, FOR SOLUTION INTRAVENOUS at 23:35

## 2018-01-22 RX ADMIN — Medication 300 MILLIGRAM(S): at 12:32

## 2018-01-22 RX ADMIN — PIPERACILLIN AND TAZOBACTAM 200 GRAM(S): 4; .5 INJECTION, POWDER, LYOPHILIZED, FOR SOLUTION INTRAVENOUS at 06:12

## 2018-01-22 RX ADMIN — Medication 650 MILLIGRAM(S): at 18:38

## 2018-01-22 RX ADMIN — ENOXAPARIN SODIUM 50 MILLIGRAM(S): 100 INJECTION SUBCUTANEOUS at 06:12

## 2018-01-22 RX ADMIN — FAMOTIDINE 20 MILLIGRAM(S): 10 INJECTION INTRAVENOUS at 12:41

## 2018-01-22 RX ADMIN — PIPERACILLIN AND TAZOBACTAM 200 GRAM(S): 4; .5 INJECTION, POWDER, LYOPHILIZED, FOR SOLUTION INTRAVENOUS at 17:55

## 2018-01-22 RX ADMIN — PIPERACILLIN AND TAZOBACTAM 200 GRAM(S): 4; .5 INJECTION, POWDER, LYOPHILIZED, FOR SOLUTION INTRAVENOUS at 12:32

## 2018-01-22 RX ADMIN — Medication 10 MILLIGRAM(S): at 00:00

## 2018-01-22 NOTE — CHART NOTE - NSCHARTNOTEFT_GEN_A_CORE
Admitting Diagnosis:   Patient is a 27y old  Female who presents with a chief complaint of Abdominal pain for 3 days (09 Jan 2018 00:22)      PAST MEDICAL & SURGICAL HISTORY:  Intestinal adhesions with obstruction, unspecified whether partial or complete: diagnostic lap with SANDRO  H/O exploratory laparotomy: with SBR and appendectomy      Current Nutrition Order: Full Liquid Diet (since 1/19)    PO Intake: Good (%) [   ]  Fair (50-75%) [ x  ] Poor (<25%) [   ] N/A    GI Issues: Last episode of diarrhea was yesterday.    Pain: No pain reported    Skin Integrity: Surgical incision      Labs:   01-22    138  |  96  |  7   ----------------------------<  97  5.1   |  27  |  0.74    Ca    10.1      22 Jan 2018 08:22  Phos  2.6     01-22  Mg     1.8     01-22      Medications:  MEDICATIONS  (STANDING):  enoxaparin Injectable 50 milliGRAM(s) SubCutaneous every 12 hours  famotidine    Tablet 20 milliGRAM(s) Oral daily  lidocaine 1%/EPINEPHrine 1:100,000 Inj 10 milliLiter(s) Local Injection once  piperacillin/tazobactam IVPB. 3.375 Gram(s) IV Intermittent every 6 hours    MEDICATIONS  (PRN):  acetaminophen   Tablet 650 milliGRAM(s) Oral every 6 hours PRN For Temp greater than 38 C (100.4 F)  ondansetron Injectable 4 milliGRAM(s) IV Push every 6 hours PRN Nausea    Weight: No new wts taken.  Height: 4'11" Weight: 110lbs, IBW 98lbs+/-10%, %%, BMI 22.2    Weight Change:  Please obtain recent wt for trending.    Estimated energy needs:   ABW used for calculations as pt between % of IBW.   Nutrient needs based on [facility] standards of care for maintenance in adults.  Protein needs increased 2/2 increased demand s/p SBR, ileocecectomy  1245-1494kcal/day (25-30kcal/kg)  50-60g pro/day (1-1.2g/kg)  1245-1494ml fluid/day (25-30ml/kg)    Subjective:   26yo F s/p diagnostic laparoscopy, ileocecectomy with small bowel resection 1/12, POD 9. Now w/ MVT on Lovenox. Pt on full liquid diet since 1/19 and tolerating PO. Had cream of wheat, OJ, jell-o for breakfast. Discussed trying ONS for additional energy/protein. Pt receptive to trying EnsureEnlive, disliked EnsureClears from [facility].     Previous Nutrition Diagnosis:  Inadequate oral intake RT unable to meet needs w/ current NPO diet order AEB pt meeting 0% estimated needs PO    Active [   ]  Resolved [ x  ] as diet is now advanced to FLD    If resolved, new PES: Suboptimal kcal/pro intake RT difficulty meeting needs on FLD AEB dietary recall and pt meeting <50% estimated needs    Goal: Pt to meet >75% estimated needs PO    Recommendations:  1) Recommend EnsureEnlive BID (700kcal, 40g pro)  2) Obtain recent standing weight  3) Advance to low fiber diet once medically feasible  4) Monitor tolerance as diet is advanced    Education: Purpose of ONS to meet nutrient needs    Risk Level: High [ x  ] Moderate [   ] Low [   ]

## 2018-01-22 NOTE — PROGRESS NOTE ADULT - ASSESSMENT
27y Female with SBO/intussusception now s/p Diagnostic laparoscopy, small bowel resection, ileocecectomy 1/12, currently with MVT on Lovenox     Pain/nausea control - IV tylenol and toradol PRN  Diet : FLD  DVT ppx: SCDS/Lovenox 50mg BID  IV Zosyn (1/17-)  AM labs (total pRBC transfusion : 1Unit)  Iron sucrose injection (1/10-1/15)   Dispo plan: Home with iron supplement  surgical culture : klebsiella pneumonie, strep bovis sensitive to cipro and clinda 27y Female with SBO/intussusception now s/p Diagnostic laparoscopy, small bowel resection, ileocecectomy 1/12, currently with MVT on Lovenox     Cont. iodoform packing  Pain/nausea control - IV tylenol and toradol PRN  Diet : advance to vegan mechanical soft diet  DVT ppx: SCDS/Lovenox 50mg BID  IV Zosyn (1/17-)  AM labs (total pRBC transfusion : 1Unit)  Iron sucrose injection (1/10-1/15)   Dispo plan: Home with iron supplement  surgical culture : klebsiella pneumonie, strep bovis sensitive to cipro and clinda

## 2018-01-22 NOTE — PROGRESS NOTE ADULT - SUBJECTIVE AND OBJECTIVE BOX
ON : c/o abd pain but tolerable. No nausea/vomiting. Plateket count 1100. VSS  1/21: Distension. F/u AXR-- mildly dilated loops    Status post : Diagnostic laparoscopy, ileocecectomy with small bowel resection 1/12, POD 9 ON : c/o abd pain but tolerable. No nausea/vomiting. Plateket count 1100. VSS  : Distension. F/u AXR-- mildly dilated loops    Status post : Diagnostic laparoscopy, ileocecectomy with small bowel resection , POD 9        SUBJECTIVE: Patient seen and examined bedside by surgery team. Continues to have loose bowel movements and some abdominal discomfort. Denies nausea, vomiting, chest pain, shortness of breath, fevers, or chills.        enoxaparin Injectable 50 milliGRAM(s) SubCutaneous every 12 hours  piperacillin/tazobactam IVPB. 3.375 Gram(s) IV Intermittent every 6 hours      Vital Signs Last 24 Hrs  T(C): 36.4 (2018 06:01), Max: 38 (2018 23:40)  T(F): 97.6 (2018 06:01), Max: 100.4 (2018 23:40)  HR: 86 (2018 06:01) (84 - 93)  BP: 109/73 (2018 06:01) (103/69 - 128/87)  BP(mean): --  RR: 17 (2018 06:01) (14 - 17)  SpO2: 100% (2018 06:01) (98% - 100%)  I&O's Detail    2018 07:01  -  2018 07:00  --------------------------------------------------------  IN:    Oral Fluid: 1100 mL  Total IN: 1100 mL    OUT:    Voided: 1600 mL  Total OUT: 1600 mL    Total NET: -500 mL        Physical Exam:   General: NAD, resting comfortably in bed  Pulm: Nonlabored breathing, no respiratory distress  Abd: soft, NT/ND. abdominal packing changed.   Extrem: WWP, no edema, SCDs in place        LABS:                        9.7    11.7  )-----------( 1110     ( 2018 07:59 )             33.7     -    138  |  98  |  7   ----------------------------<  117<H>  4.8   |  26  |  0.66    Ca    10.1      2018 15:01  Phos  2.6       Mg     2.0             Urinalysis Basic - ( 2018 00:43 )    Color: Yellow / Appearance: Clear / S.015 / pH: x  Gluc: x / Ketone: >=80 mg/dL  / Bili: Negative / Urobili: 1.0 E.U./dL   Blood: x / Protein: Trace mg/dL / Nitrite: NEGATIVE   Leuk Esterase: Moderate / RBC: < 5 /HPF / WBC 5-10 /HPF   Sq Epi: x / Non Sq Epi: 0-5 /HPF / Bacteria: Present /HPF        RADIOLOGY & ADDITIONAL STUDIES:

## 2018-01-23 LAB
ANION GAP SERPL CALC-SCNC: 13 MMOL/L — SIGNIFICANT CHANGE UP (ref 5–17)
APTT BLD: 33.6 SEC — SIGNIFICANT CHANGE UP (ref 27.5–37.4)
BASOPHILS NFR BLD AUTO: 0.1 % — SIGNIFICANT CHANGE UP (ref 0–2)
BUN SERPL-MCNC: 5 MG/DL — LOW (ref 7–23)
CALCIUM SERPL-MCNC: 9.6 MG/DL — SIGNIFICANT CHANGE UP (ref 8.4–10.5)
CHLORIDE SERPL-SCNC: 97 MMOL/L — SIGNIFICANT CHANGE UP (ref 96–108)
CO2 SERPL-SCNC: 26 MMOL/L — SIGNIFICANT CHANGE UP (ref 22–31)
CREAT SERPL-MCNC: 0.69 MG/DL — SIGNIFICANT CHANGE UP (ref 0.5–1.3)
EOSINOPHIL NFR BLD AUTO: 0.7 % — SIGNIFICANT CHANGE UP (ref 0–6)
GLUCOSE SERPL-MCNC: 108 MG/DL — HIGH (ref 70–99)
HCT VFR BLD CALC: 31.8 % — LOW (ref 34.5–45)
HGB BLD-MCNC: 9.1 G/DL — LOW (ref 11.5–15.5)
INR BLD: 1.25 — HIGH (ref 0.88–1.16)
LACTATE SERPL-SCNC: 2 MMOL/L — SIGNIFICANT CHANGE UP (ref 0.5–2)
LYMPHOCYTES # BLD AUTO: 10.1 % — LOW (ref 13–44)
MAGNESIUM SERPL-MCNC: 1.9 MG/DL — SIGNIFICANT CHANGE UP (ref 1.6–2.6)
MCHC RBC-ENTMCNC: 21.9 PG — LOW (ref 27–34)
MCHC RBC-ENTMCNC: 28.6 G/DL — LOW (ref 32–36)
MCV RBC AUTO: 76.4 FL — LOW (ref 80–100)
MONOCYTES NFR BLD AUTO: 6.4 % — SIGNIFICANT CHANGE UP (ref 2–14)
NEUTROPHILS NFR BLD AUTO: 82.7 % — HIGH (ref 43–77)
PHOSPHATE SERPL-MCNC: 2.3 MG/DL — LOW (ref 2.5–4.5)
PLATELET # BLD AUTO: 1041 K/UL — CRITICAL HIGH (ref 150–400)
POTASSIUM SERPL-MCNC: 4.6 MMOL/L — SIGNIFICANT CHANGE UP (ref 3.5–5.3)
POTASSIUM SERPL-SCNC: 4.6 MMOL/L — SIGNIFICANT CHANGE UP (ref 3.5–5.3)
PROTHROM AB SERPL-ACNC: 13.9 SEC — HIGH (ref 9.8–12.7)
RBC # BLD: 4.11 M/UL — SIGNIFICANT CHANGE UP (ref 3.8–5.2)
RBC # BLD: 4.16 M/UL — SIGNIFICANT CHANGE UP (ref 3.8–5.2)
RBC # FLD: 29.3 % — HIGH (ref 10.3–16.9)
RETICS/RBC NFR: 2.2 % — SIGNIFICANT CHANGE UP (ref 0.5–2.5)
SODIUM SERPL-SCNC: 136 MMOL/L — SIGNIFICANT CHANGE UP (ref 135–145)
WBC # BLD: 15.7 K/UL — HIGH (ref 3.8–10.5)
WBC # FLD AUTO: 15.7 K/UL — HIGH (ref 3.8–10.5)

## 2018-01-23 PROCEDURE — 74177 CT ABD & PELVIS W/CONTRAST: CPT | Mod: 26

## 2018-01-23 PROCEDURE — 71260 CT THORAX DX C+: CPT | Mod: 26

## 2018-01-23 RX ORDER — DIATRIZOATE MEGLUMINE 180 MG/ML
30 INJECTION, SOLUTION INTRAVESICAL ONCE
Qty: 0 | Refills: 0 | Status: COMPLETED | OUTPATIENT
Start: 2018-01-23 | End: 2018-01-23

## 2018-01-23 RX ORDER — HYDROMORPHONE HYDROCHLORIDE 2 MG/ML
0.5 INJECTION INTRAMUSCULAR; INTRAVENOUS; SUBCUTANEOUS ONCE
Qty: 0 | Refills: 0 | Status: DISCONTINUED | OUTPATIENT
Start: 2018-01-23 | End: 2018-01-23

## 2018-01-23 RX ORDER — SODIUM CHLORIDE 9 MG/ML
1000 INJECTION, SOLUTION INTRAVENOUS
Qty: 0 | Refills: 0 | Status: DISCONTINUED | OUTPATIENT
Start: 2018-01-23 | End: 2018-01-24

## 2018-01-23 RX ORDER — HYDROMORPHONE HYDROCHLORIDE 2 MG/ML
0.25 INJECTION INTRAMUSCULAR; INTRAVENOUS; SUBCUTANEOUS ONCE
Qty: 0 | Refills: 0 | Status: DISCONTINUED | OUTPATIENT
Start: 2018-01-23 | End: 2018-01-23

## 2018-01-23 RX ORDER — SODIUM,POTASSIUM PHOSPHATES 278-250MG
1 POWDER IN PACKET (EA) ORAL EVERY 4 HOURS
Qty: 0 | Refills: 0 | Status: COMPLETED | OUTPATIENT
Start: 2018-01-23 | End: 2018-01-23

## 2018-01-23 RX ORDER — OXYCODONE AND ACETAMINOPHEN 5; 325 MG/1; MG/1
1 TABLET ORAL ONCE
Qty: 0 | Refills: 0 | Status: DISCONTINUED | OUTPATIENT
Start: 2018-01-23 | End: 2018-01-23

## 2018-01-23 RX ORDER — SODIUM,POTASSIUM PHOSPHATES 278-250MG
1 POWDER IN PACKET (EA) ORAL EVERY 4 HOURS
Qty: 0 | Refills: 0 | Status: DISCONTINUED | OUTPATIENT
Start: 2018-01-23 | End: 2018-01-23

## 2018-01-23 RX ADMIN — HYDROMORPHONE HYDROCHLORIDE 0.5 MILLIGRAM(S): 2 INJECTION INTRAMUSCULAR; INTRAVENOUS; SUBCUTANEOUS at 07:35

## 2018-01-23 RX ADMIN — PIPERACILLIN AND TAZOBACTAM 200 GRAM(S): 4; .5 INJECTION, POWDER, LYOPHILIZED, FOR SOLUTION INTRAVENOUS at 11:17

## 2018-01-23 RX ADMIN — DIATRIZOATE MEGLUMINE 30 MILLILITER(S): 180 INJECTION, SOLUTION INTRAVESICAL at 10:11

## 2018-01-23 RX ADMIN — OXYCODONE AND ACETAMINOPHEN 1 TABLET(S): 5; 325 TABLET ORAL at 14:43

## 2018-01-23 RX ADMIN — OXYCODONE AND ACETAMINOPHEN 1 TABLET(S): 5; 325 TABLET ORAL at 13:43

## 2018-01-23 RX ADMIN — Medication 650 MILLIGRAM(S): at 21:30

## 2018-01-23 RX ADMIN — HYDROMORPHONE HYDROCHLORIDE 0.5 MILLIGRAM(S): 2 INJECTION INTRAMUSCULAR; INTRAVENOUS; SUBCUTANEOUS at 08:05

## 2018-01-23 RX ADMIN — Medication 650 MILLIGRAM(S): at 05:50

## 2018-01-23 RX ADMIN — HYDROMORPHONE HYDROCHLORIDE 0.25 MILLIGRAM(S): 2 INJECTION INTRAMUSCULAR; INTRAVENOUS; SUBCUTANEOUS at 22:30

## 2018-01-23 RX ADMIN — PIPERACILLIN AND TAZOBACTAM 200 GRAM(S): 4; .5 INJECTION, POWDER, LYOPHILIZED, FOR SOLUTION INTRAVENOUS at 22:04

## 2018-01-23 RX ADMIN — Medication 650 MILLIGRAM(S): at 20:50

## 2018-01-23 RX ADMIN — PIPERACILLIN AND TAZOBACTAM 200 GRAM(S): 4; .5 INJECTION, POWDER, LYOPHILIZED, FOR SOLUTION INTRAVENOUS at 05:50

## 2018-01-23 RX ADMIN — HYDROMORPHONE HYDROCHLORIDE 0.25 MILLIGRAM(S): 2 INJECTION INTRAMUSCULAR; INTRAVENOUS; SUBCUTANEOUS at 22:04

## 2018-01-23 RX ADMIN — FAMOTIDINE 20 MILLIGRAM(S): 10 INJECTION INTRAVENOUS at 11:17

## 2018-01-23 RX ADMIN — Medication 1 TABLET(S): at 11:17

## 2018-01-23 RX ADMIN — ENOXAPARIN SODIUM 50 MILLIGRAM(S): 100 INJECTION SUBCUTANEOUS at 05:51

## 2018-01-23 RX ADMIN — Medication 1 TABLET(S): at 22:04

## 2018-01-23 NOTE — PROVIDER CONTACT NOTE (CRITICAL VALUE NOTIFICATION) - ACTION/TREATMENT ORDERED:
Recheck VS
no intervention at this time per Dr Boles
No instructions given.
MD Dove states he will review with the team moving fwd the action or plan for patient.

## 2018-01-23 NOTE — PROGRESS NOTE ADULT - ASSESSMENT
27y Female with SBO/intussusception now s/p Diagnostic laparoscopy, small bowel resection, ileocecectomy 1/12, currently with MVT on Lovenox     Pain/nausea control - IV tylenol and toradol PRN  Diet : Jaydon mechanical soft diet   DVT ppx: SCDS/Lovenox 50mg BID  IV Zosyn (1/17-)  AM labs (total pRBC transfusion : 1Unit)  Iron sucrose injection (1/10-1/15)   Dispo plan: Home with iron supplement  surgical culture : klebsiella pneumonie, strep bovis sensitive to cipro and clinda 27y Female with SBO/intussusception now s/p Diagnostic laparoscopy, small bowel resection, ileocecectomy 1/12, currently with MVT on Lovenox     WBC 15.7 this AM, up from 12.9 yesterday. Low grade fever of 100.4 at 6AM    Pain/nausea control - IV tylenol and toradol PRN  Diet : Jaydon mechanical soft diet   DVT ppx: SCDS/Lovenox 50mg BID  IV Zosyn (1/17-)  AM labs (total pRBC transfusion : 1Unit)  Iron sucrose injection (1/10-1/15)   Dispo plan: Home with iron supplement  surgical culture : klebsiella pneumonie, strep bovis sensitive to cipro and clinda

## 2018-01-23 NOTE — PROGRESS NOTE ADULT - SUBJECTIVE AND OBJECTIVE BOX
ON : Still having occasional pain but tolerating to diet.   1/21: cont. loose BMs; advanced to vegan mechanical soft diet; PLTs in AM 1118; no heme consult yet, likely reactive thrombocytosis (per Tim); ON : Still having occasional pain but tolerating to diet.   : cont. loose BMs; advanced to vegan mechanical soft diet; PLTs in AM 1118; no heme consult yet, likely reactive thrombocytosis (per Tim);         SUBJECTIVE: Patient seen and examined bedside by surgery team. Iodoform packing for midline abdominal wound changed at bedside. Low grade fever of 100.4 at 6AM. Pain well-controlled. Denies nausea, vomiting, chest pain, shortness of breath, fevers, or chills.          enoxaparin Injectable 50 milliGRAM(s) SubCutaneous every 12 hours  piperacillin/tazobactam IVPB. 3.375 Gram(s) IV Intermittent every 6 hours      Vital Signs Last 24 Hrs  T(C): 36.9 (2018 06:30), Max: 38 (2018 05:04)  T(F): 98.4 (2018 06:30), Max: 100.4 (2018 05:04)  HR: 95 (2018 06:30) (90 - 96)  BP: 102/74 (2018 06:30) (96/63 - 111/73)  BP(mean): --  RR: 16 (2018 06:30) (16 - 17)  SpO2: 99% (2018 06:30) (97% - 100%)  I&O's Detail    2018 07:01  -  2018 07:00  --------------------------------------------------------  IN:    Oral Fluid: 600 mL    Solution: 200 mL  Total IN: 800 mL    OUT:    Voided: 1200 mL  Total OUT: 1200 mL    Total NET: -400 mL            Physical Exam:   General: NAD, resting comfortably in bed  Pulm: Nonlabored breathing, no respiratory distress  Abd: soft, NT/ND. abdominal packing changed.   Extrem: WWP, no edema, SCDs in place        LABS:                        9.1    15.7  )-----------( 1041     ( 2018 06:47 )             31.8     -    136  |  97  |  5<L>  ----------------------------<  108<H>  4.6   |  26  |  0.69    Ca    9.6      2018 06:43  Phos  2.3       Mg     1.9             Urinalysis Basic - ( 2018 00:43 )    Color: Yellow / Appearance: Clear / S.015 / pH: x  Gluc: x / Ketone: >=80 mg/dL  / Bili: Negative / Urobili: 1.0 E.U./dL   Blood: x / Protein: Trace mg/dL / Nitrite: NEGATIVE   Leuk Esterase: Moderate / RBC: < 5 /HPF / WBC 5-10 /HPF   Sq Epi: x / Non Sq Epi: 0-5 /HPF / Bacteria: Present /HPF        RADIOLOGY & ADDITIONAL STUDIES:

## 2018-01-23 NOTE — PROVIDER CONTACT NOTE (CRITICAL VALUE NOTIFICATION) - BACKGROUND
s/p diagnostic lap, SBR with ileocecectomy
27 year old female no pmh, 1/18/18 diagnostic lap, SBR with ileocecectomy was performed.

## 2018-01-24 LAB
ALBUMIN SERPL ELPH-MCNC: 3.2 G/DL — LOW (ref 3.3–5)
ALP SERPL-CCNC: 61 U/L — SIGNIFICANT CHANGE UP (ref 40–120)
ALT FLD-CCNC: 14 U/L — SIGNIFICANT CHANGE UP (ref 10–45)
ANION GAP SERPL CALC-SCNC: 11 MMOL/L — SIGNIFICANT CHANGE UP (ref 5–17)
APTT BLD: 29.3 SEC — SIGNIFICANT CHANGE UP (ref 27.5–37.4)
AST SERPL-CCNC: 20 U/L — SIGNIFICANT CHANGE UP (ref 10–40)
BILIRUB SERPL-MCNC: 0.3 MG/DL — SIGNIFICANT CHANGE UP (ref 0.2–1.2)
BUN SERPL-MCNC: 4 MG/DL — LOW (ref 7–23)
CALCIUM SERPL-MCNC: 9.5 MG/DL — SIGNIFICANT CHANGE UP (ref 8.4–10.5)
CHLORIDE SERPL-SCNC: 98 MMOL/L — SIGNIFICANT CHANGE UP (ref 96–108)
CO2 SERPL-SCNC: 29 MMOL/L — SIGNIFICANT CHANGE UP (ref 22–31)
CREAT SERPL-MCNC: 0.58 MG/DL — SIGNIFICANT CHANGE UP (ref 0.5–1.3)
GLUCOSE SERPL-MCNC: 88 MG/DL — SIGNIFICANT CHANGE UP (ref 70–99)
GRAM STN FLD: SIGNIFICANT CHANGE UP
HCT VFR BLD CALC: 31.1 % — LOW (ref 34.5–45)
HGB BLD-MCNC: 8.9 G/DL — LOW (ref 11.5–15.5)
INR BLD: 1.17 — HIGH (ref 0.88–1.16)
MAGNESIUM SERPL-MCNC: 1.8 MG/DL — SIGNIFICANT CHANGE UP (ref 1.6–2.6)
MCHC RBC-ENTMCNC: 22.3 PG — LOW (ref 27–34)
MCHC RBC-ENTMCNC: 28.6 G/DL — LOW (ref 32–36)
MCV RBC AUTO: 77.9 FL — LOW (ref 80–100)
PHOSPHATE SERPL-MCNC: 2.7 MG/DL — SIGNIFICANT CHANGE UP (ref 2.5–4.5)
PLATELET # BLD AUTO: 793 K/UL — HIGH (ref 150–400)
POTASSIUM SERPL-MCNC: 4.3 MMOL/L — SIGNIFICANT CHANGE UP (ref 3.5–5.3)
POTASSIUM SERPL-SCNC: 4.3 MMOL/L — SIGNIFICANT CHANGE UP (ref 3.5–5.3)
PROT SERPL-MCNC: 7.8 G/DL — SIGNIFICANT CHANGE UP (ref 6–8.3)
PROTHROM AB SERPL-ACNC: 13 SEC — HIGH (ref 9.8–12.7)
RBC # BLD: 3.99 M/UL — SIGNIFICANT CHANGE UP (ref 3.8–5.2)
RBC # FLD: 28.4 % — HIGH (ref 10.3–16.9)
SODIUM SERPL-SCNC: 138 MMOL/L — SIGNIFICANT CHANGE UP (ref 135–145)
SPECIMEN SOURCE: SIGNIFICANT CHANGE UP
WBC # BLD: 10.7 K/UL — HIGH (ref 3.8–10.5)
WBC # FLD AUTO: 10.7 K/UL — HIGH (ref 3.8–10.5)

## 2018-01-24 PROCEDURE — 49406 IMAGE CATH FLUID PERI/RETRO: CPT | Mod: 59

## 2018-01-24 PROCEDURE — 99152 MOD SED SAME PHYS/QHP 5/>YRS: CPT

## 2018-01-24 RX ORDER — ENOXAPARIN SODIUM 100 MG/ML
50 INJECTION SUBCUTANEOUS
Qty: 0 | Refills: 0 | Status: DISCONTINUED | OUTPATIENT
Start: 2018-01-24 | End: 2018-01-26

## 2018-01-24 RX ORDER — PIPERACILLIN AND TAZOBACTAM 4; .5 G/20ML; G/20ML
3.38 INJECTION, POWDER, LYOPHILIZED, FOR SOLUTION INTRAVENOUS EVERY 6 HOURS
Qty: 0 | Refills: 0 | Status: DISCONTINUED | OUTPATIENT
Start: 2018-01-24 | End: 2018-01-24

## 2018-01-24 RX ADMIN — PIPERACILLIN AND TAZOBACTAM 200 GRAM(S): 4; .5 INJECTION, POWDER, LYOPHILIZED, FOR SOLUTION INTRAVENOUS at 05:40

## 2018-01-24 RX ADMIN — PIPERACILLIN AND TAZOBACTAM 200 GRAM(S): 4; .5 INJECTION, POWDER, LYOPHILIZED, FOR SOLUTION INTRAVENOUS at 09:10

## 2018-01-24 RX ADMIN — Medication 650 MILLIGRAM(S): at 03:02

## 2018-01-24 RX ADMIN — Medication 650 MILLIGRAM(S): at 04:02

## 2018-01-24 RX ADMIN — FAMOTIDINE 20 MILLIGRAM(S): 10 INJECTION INTRAVENOUS at 14:44

## 2018-01-24 RX ADMIN — PIPERACILLIN AND TAZOBACTAM 200 GRAM(S): 4; .5 INJECTION, POWDER, LYOPHILIZED, FOR SOLUTION INTRAVENOUS at 14:44

## 2018-01-24 RX ADMIN — ENOXAPARIN SODIUM 50 MILLIGRAM(S): 100 INJECTION SUBCUTANEOUS at 18:48

## 2018-01-24 NOTE — PROGRESS NOTE ADULT - ASSESSMENT
27y Female with SBO/intussusception now s/p Diagnostic laparoscopy, small bowel resection, ileocecectomy 1/12, currently with possible MVT on Lovenox, c/w fluid collections over pelvic inlet and R paracolic gutter.     For IR drainage in AM  NPO/IVF for procedure  Pain/nausea control - IV tylenol and toradol PRN  DVT ppx: SCDS/Lovenox 50mg BID  IV Zosyn (1/17-)  AM labs (total pRBC transfusion : 1Unit)  Iron sucrose injection (1/10-1/15)   Dispo plan: Home with iron supplement  surgical culture : klebsiella pneumonie, strep bovis sensitive to cipro and clinda 27y Female with SBO/intussusception now s/p Diagnostic laparoscopy, small bowel resection, ileocecectomy 1/12, currently with possible MVT on Lovenox, c/w fluid collections over pelvic inlet and R paracolic gutter.     For IR drainage  NPO/IVF for procedure  Pain/nausea control - IV tylenol and toradol PRN  DVT ppx: SCDS/Lovenox 50mg BID  IV Zosyn (1/17-)  AM labs (total pRBC transfusion : 1Unit)  Iron sucrose injection (1/10-1/15)   Dispo plan: Home with iron supplement  surgical culture : klebsiella pneumonie, strep bovis sensitive to cipro and clinda

## 2018-01-24 NOTE — PROGRESS NOTE ADULT - SUBJECTIVE AND OBJECTIVE BOX
ON : NPO after midnight, c/o pain, for IR drainage tomorrow  1/22: WBC 15.7 [12.9]; T: 100.4 @ 6AM; CT c/a/p ordered; UA + leuk esterase; urine cx sent; lactate 2.0; CT results: 2 collections [id number] bilobed in pelvic inlet + 4.4 x 3.8 cm collection in R paracolic gutter; IR drainage tomorrow ON : NPO after midnight, c/o pain, for IR drainage tomorrow  1/22: WBC 15.7 [12.9]; T: 100.4 @ 6AM; CT c/a/p ordered; UA + leuk esterase; urine cx sent; lactate 2.0; CT results: 2 collections [id number] bilobed in pelvic inlet + 4.4 x 3.8 cm collection in R paracolic gutter; IR drainage tomorrow    SUBJECTIVE:  patient seen at bedside, without new complaints at this time. for IR drainage today    MEDICATIONS  (STANDING):  famotidine    Tablet 20 milliGRAM(s) Oral daily  lactated ringers. 1000 milliLiter(s) (90 mL/Hr) IV Continuous <Continuous>  lidocaine 1%/EPINEPHrine 1:100,000 Inj 10 milliLiter(s) Local Injection once  piperacillin/tazobactam IVPB. 3.375 Gram(s) IV Intermittent every 6 hours    MEDICATIONS  (PRN):  acetaminophen   Tablet 650 milliGRAM(s) Oral every 6 hours PRN For Temp greater than 38 C (100.4 F)  acetaminophen   Tablet. 650 milliGRAM(s) Oral every 6 hours PRN Mild Pain (1 - 3)  ondansetron Injectable 4 milliGRAM(s) IV Push every 6 hours PRN Nausea      Vital Signs Last 24 Hrs  T(C): 37.1 (24 Jan 2018 05:15), Max: 37.5 (23 Jan 2018 21:08)  T(F): 98.7 (24 Jan 2018 05:15), Max: 99.5 (23 Jan 2018 21:08)  HR: 92 (24 Jan 2018 05:15) (85 - 100)  BP: 101/72 (24 Jan 2018 05:15) (99/63 - 106/72)  BP(mean): --  RR: 16 (24 Jan 2018 05:15) (16 - 18)  SpO2: 99% (24 Jan 2018 05:15) (95% - 100%)    PHYSICAL EXAM:      Constitutional: A&Ox3    Respiratory: non labored breathing, no respiratory distress    Cardiovascular: NSR, RRR    Gastrointestinal: soft, tenderness at the lower abdomen                 Incision: small wound at inferior aspect of incision, clean without active discharge or erythema, packed with iodoform    Extremities: (-) edema          I&O's Detail    23 Jan 2018 07:01  -  24 Jan 2018 07:00  --------------------------------------------------------  IN:    lactated ringers.: 630 mL    Oral Fluid: 770 mL    Solution: 100 mL  Total IN: 1500 mL    OUT:    Voided: 1900 mL  Total OUT: 1900 mL    Total NET: -400 mL          LABS:                        9.1    15.7  )-----------( 1041     ( 23 Jan 2018 06:47 )             31.8     01-23    136  |  97  |  5<L>  ----------------------------<  108<H>  4.6   |  26  |  0.69    Ca    9.6      23 Jan 2018 06:43  Phos  2.3     01-23  Mg     1.9     01-23      PT/INR - ( 23 Jan 2018 10:53 )   PT: 13.9 sec;   INR: 1.25          PTT - ( 23 Jan 2018 10:53 )  PTT:33.6 sec

## 2018-01-25 LAB
ALBUMIN SERPL ELPH-MCNC: 3.1 G/DL — LOW (ref 3.3–5)
ALP SERPL-CCNC: 63 U/L — SIGNIFICANT CHANGE UP (ref 40–120)
ALT FLD-CCNC: 14 U/L — SIGNIFICANT CHANGE UP (ref 10–45)
ANION GAP SERPL CALC-SCNC: 14 MMOL/L — SIGNIFICANT CHANGE UP (ref 5–17)
AST SERPL-CCNC: 24 U/L — SIGNIFICANT CHANGE UP (ref 10–40)
BILIRUB SERPL-MCNC: 0.3 MG/DL — SIGNIFICANT CHANGE UP (ref 0.2–1.2)
BUN SERPL-MCNC: 5 MG/DL — LOW (ref 7–23)
CALCIUM SERPL-MCNC: 9.4 MG/DL — SIGNIFICANT CHANGE UP (ref 8.4–10.5)
CHLORIDE SERPL-SCNC: 95 MMOL/L — LOW (ref 96–108)
CO2 SERPL-SCNC: 27 MMOL/L — SIGNIFICANT CHANGE UP (ref 22–31)
CREAT SERPL-MCNC: 0.51 MG/DL — SIGNIFICANT CHANGE UP (ref 0.5–1.3)
GLUCOSE SERPL-MCNC: 90 MG/DL — SIGNIFICANT CHANGE UP (ref 70–99)
HCT VFR BLD CALC: 32.5 % — LOW (ref 34.5–45)
HGB BLD-MCNC: 9.4 G/DL — LOW (ref 11.5–15.5)
MAGNESIUM SERPL-MCNC: 1.9 MG/DL — SIGNIFICANT CHANGE UP (ref 1.6–2.6)
MCHC RBC-ENTMCNC: 22.6 PG — LOW (ref 27–34)
MCHC RBC-ENTMCNC: 28.9 G/DL — LOW (ref 32–36)
MCV RBC AUTO: 78.1 FL — LOW (ref 80–100)
PHOSPHATE SERPL-MCNC: 2.5 MG/DL — SIGNIFICANT CHANGE UP (ref 2.5–4.5)
PLATELET # BLD AUTO: 752 K/UL — HIGH (ref 150–400)
POTASSIUM SERPL-MCNC: 4.4 MMOL/L — SIGNIFICANT CHANGE UP (ref 3.5–5.3)
POTASSIUM SERPL-SCNC: 4.4 MMOL/L — SIGNIFICANT CHANGE UP (ref 3.5–5.3)
PROT SERPL-MCNC: 8 G/DL — SIGNIFICANT CHANGE UP (ref 6–8.3)
RBC # BLD: 4.16 M/UL — SIGNIFICANT CHANGE UP (ref 3.8–5.2)
RBC # FLD: 28.6 % — HIGH (ref 10.3–16.9)
SODIUM SERPL-SCNC: 136 MMOL/L — SIGNIFICANT CHANGE UP (ref 135–145)
WBC # BLD: 6.8 K/UL — SIGNIFICANT CHANGE UP (ref 3.8–10.5)
WBC # FLD AUTO: 6.8 K/UL — SIGNIFICANT CHANGE UP (ref 3.8–10.5)

## 2018-01-25 RX ORDER — MAGNESIUM SULFATE 500 MG/ML
2 VIAL (ML) INJECTION ONCE
Qty: 0 | Refills: 0 | Status: COMPLETED | OUTPATIENT
Start: 2018-01-25 | End: 2018-01-25

## 2018-01-25 RX ADMIN — Medication 50 GRAM(S): at 11:34

## 2018-01-25 RX ADMIN — ENOXAPARIN SODIUM 50 MILLIGRAM(S): 100 INJECTION SUBCUTANEOUS at 18:23

## 2018-01-25 RX ADMIN — FAMOTIDINE 20 MILLIGRAM(S): 10 INJECTION INTRAVENOUS at 11:34

## 2018-01-25 RX ADMIN — ENOXAPARIN SODIUM 50 MILLIGRAM(S): 100 INJECTION SUBCUTANEOUS at 05:05

## 2018-01-25 NOTE — PROGRESS NOTE ADULT - ASSESSMENT
27 year old female s/p IR drainage POD 1    Plan:  pain and nausea control  f/u culture from IR drainage  DVT prophylaxis: lovenox restarted  PO diet  f/u AM labs  continue to monitor

## 2018-01-25 NOTE — CHART NOTE - NSCHARTNOTEFT_GEN_A_CORE
Admitting Diagnosis:   Patient is a 27y old  Female who presents with a chief complaint of Abdominal pain for 3 days (09 Jan 2018 00:22)      PAST MEDICAL & SURGICAL HISTORY:  Intestinal adhesions with obstruction, unspecified whether partial or complete: diagnostic lap with SANDRO  H/O exploratory laparotomy: with SBR and appendectomy      Current Nutrition Order:   Diet, Mechanical Soft:   Lacto-Ovo Veg (Accepts Milk Prod., Eggs)  Supplement Feeding Modality:  Oral  Ensure Enlive Cans or Servings Per Day:  1       Frequency:  Three Times a day (01-24-18 @ 14:01)      PO Intake: Good (%) [ X  ]  Fair (50-75%) [   ] Poor (<25%) [   ]    GI Issues: None reported    Pain: Unable to assess as pt sleeping/lethargic/declined interview    Skin Integrity: surgical site lower abomen, SAVANNAH drains x 3    Labs:   01-25    136  |  95<L>  |  5<L>  ----------------------------<  90  4.4   |  27  |  0.51    Ca    9.4      25 Jan 2018 07:24  Phos  2.5     01-25  Mg     1.9     01-25    TPro  8.0  /  Alb  3.1<L>  /  TBili  0.3  /  DBili  x   /  AST  24  /  ALT  14  /  AlkPhos  63  01-25    CAPILLARY BLOOD GLUCOSE          Medications:  MEDICATIONS  (STANDING):  enoxaparin Injectable 50 milliGRAM(s) SubCutaneous two times a day  famotidine    Tablet 20 milliGRAM(s) Oral daily    MEDICATIONS  (PRN):  acetaminophen   Tablet 650 milliGRAM(s) Oral every 6 hours PRN For Temp greater than 38 C (100.4 F)  acetaminophen   Tablet. 650 milliGRAM(s) Oral every 6 hours PRN Mild Pain (1 - 3)  ondansetron Injectable 4 milliGRAM(s) IV Push every 6 hours PRN Nausea      Weight: 49.9kg       Weight Change: No new weights    Estimated energy needs: Calories: 25-30kcal/kg (49.9kg)=1245-1494kcal/day  Protein: 0.75-1g/kg = 37-49g/day  Fluid: 25-30mL/kg = 1245-1494mL/day    Subjective: Patient seen sleeping at bedside, s/p IR drainage POD#1 appears lethargic, awoke to writer's voice however pt did not maintain wakefulness for duration of interview. Breakfast tray seen at bedside ~75% completed. Mechanical Soft diet may be well-tolerated due to recent GI surgery however pt does not indicate chewing/swallowing difficulty, consider low residue diet. Pt may benefit from SLP evaluation.     Previous Nutrition Diagnosis: Inadequate oral intake related to NPO as evidenced by 0% of estimated needs x 4 days    Active [   ]  Resolved [ X  ]    If resolved, new PES: Increased nutrient needs related to increased demand for protein as evidenced by post-op.    Goal: Meet >75% of estimated needs without GI distress x 5 days    Recommendations: Low residue diet  Consider SLP evaluation secondary to modified texture diet  MVI w/ minerals daily  Calcium citrate BID    Education: Pt declined    Risk Level: High [ X  ] Moderate [   ] Low [   ]

## 2018-01-25 NOTE — PROGRESS NOTE ADULT - SUBJECTIVE AND OBJECTIVE BOX
ON : MARY, no pain, afebrile. SAVANNAH output minimal (5/10/20)  1/24: IR for drainage; drain x3; restarted on diet (pescotarian); restarted on Lovenox; serous drainage from IR drains. ON : MARY, no pain, afebrile. SAVANNAH output minimal (5/10/20)  1/24: IR for drainage; drain x3; restarted on diet (pescotarian); restarted on Lovenox; serous drainage from IR drains.    SUBJECTIVE:  patient seen at bedside, does not offer new complaints at this time.    MEDICATIONS  (STANDING):  enoxaparin Injectable 50 milliGRAM(s) SubCutaneous two times a day  famotidine    Tablet 20 milliGRAM(s) Oral daily  lidocaine 1%/EPINEPHrine 1:100,000 Inj 10 milliLiter(s) Local Injection once    MEDICATIONS  (PRN):  acetaminophen   Tablet 650 milliGRAM(s) Oral every 6 hours PRN For Temp greater than 38 C (100.4 F)  acetaminophen   Tablet. 650 milliGRAM(s) Oral every 6 hours PRN Mild Pain (1 - 3)  ondansetron Injectable 4 milliGRAM(s) IV Push every 6 hours PRN Nausea      Vital Signs Last 24 Hrs  T(C): 37.1 (25 Jan 2018 05:45), Max: 37.6 (24 Jan 2018 20:20)  T(F): 98.7 (25 Jan 2018 05:45), Max: 99.7 (24 Jan 2018 23:56)  HR: 87 (25 Jan 2018 05:45) (80 - 99)  BP: 100/65 (25 Jan 2018 05:45) (93/62 - 105/70)  BP(mean): --  RR: 16 (25 Jan 2018 05:45) (16 - 17)  SpO2: 100% (25 Jan 2018 05:45) (97% - 100%)    PHYSICAL EXAM:      Constitutional: A&Ox3    Respiratory: non labored breathing, no respiratory distress    Cardiovascular: NSR, RRR    Gastrointestinal: soft, mild midline tenderness, nondistended, wound at inferior end of incision without erythema or active dc-packed with iodoform and 4x4, SAVANNAH drains x3 with minimal serous fluid, without masses or organomegaly    Extremities: (-) edema      I&O's Detail    24 Jan 2018 07:01  -  25 Jan 2018 07:00  --------------------------------------------------------  IN:    Oral Fluid: 600 mL    Solution: 200 mL  Total IN: 800 mL    OUT:    Bulb: 35 mL    Bulb: 90 mL    Bulb: 5 mL    Voided: 1350 mL  Total OUT: 1480 mL    Total NET: -680 mL          LABS:                        9.4    6.8   )-----------( 752      ( 25 Jan 2018 06:51 )             32.5     01-25    136  |  95<L>  |  5<L>  ----------------------------<  90  4.4   |  27  |  0.51    Ca    9.4      25 Jan 2018 07:24  Phos  2.5     01-25  Mg     1.9     01-25    TPro  8.0  /  Alb  3.1<L>  /  TBili  0.3  /  DBili  x   /  AST  24  /  ALT  14  /  AlkPhos  63  01-25    PT/INR - ( 24 Jan 2018 08:28 )   PT: 13.0 sec;   INR: 1.17          PTT - ( 24 Jan 2018 08:28 )  PTT:29.3 sec

## 2018-01-26 ENCOUNTER — TRANSCRIPTION ENCOUNTER (OUTPATIENT)
Age: 28
End: 2018-01-26

## 2018-01-26 VITALS
RESPIRATION RATE: 16 BRPM | HEART RATE: 81 BPM | DIASTOLIC BLOOD PRESSURE: 73 MMHG | OXYGEN SATURATION: 100 % | TEMPERATURE: 99 F | SYSTOLIC BLOOD PRESSURE: 106 MMHG

## 2018-01-26 LAB
ANION GAP SERPL CALC-SCNC: 12 MMOL/L — SIGNIFICANT CHANGE UP (ref 5–17)
BUN SERPL-MCNC: 7 MG/DL — SIGNIFICANT CHANGE UP (ref 7–23)
CALCIUM SERPL-MCNC: 9.7 MG/DL — SIGNIFICANT CHANGE UP (ref 8.4–10.5)
CHLORIDE SERPL-SCNC: 99 MMOL/L — SIGNIFICANT CHANGE UP (ref 96–108)
CO2 SERPL-SCNC: 27 MMOL/L — SIGNIFICANT CHANGE UP (ref 22–31)
CREAT SERPL-MCNC: 0.54 MG/DL — SIGNIFICANT CHANGE UP (ref 0.5–1.3)
CULTURE RESULTS: NO GROWTH — SIGNIFICANT CHANGE UP
CULTURE RESULTS: NO GROWTH — SIGNIFICANT CHANGE UP
GLUCOSE SERPL-MCNC: 106 MG/DL — HIGH (ref 70–99)
HCT VFR BLD CALC: 32.2 % — LOW (ref 34.5–45)
HGB BLD-MCNC: 9.5 G/DL — LOW (ref 11.5–15.5)
MAGNESIUM SERPL-MCNC: 2 MG/DL — SIGNIFICANT CHANGE UP (ref 1.6–2.6)
MCHC RBC-ENTMCNC: 22.9 PG — LOW (ref 27–34)
MCHC RBC-ENTMCNC: 29.5 G/DL — LOW (ref 32–36)
MCV RBC AUTO: 77.8 FL — LOW (ref 80–100)
PHOSPHATE SERPL-MCNC: 2.8 MG/DL — SIGNIFICANT CHANGE UP (ref 2.5–4.5)
PLATELET # BLD AUTO: 653 K/UL — HIGH (ref 150–400)
POTASSIUM SERPL-MCNC: 4.3 MMOL/L — SIGNIFICANT CHANGE UP (ref 3.5–5.3)
POTASSIUM SERPL-SCNC: 4.3 MMOL/L — SIGNIFICANT CHANGE UP (ref 3.5–5.3)
RBC # BLD: 4.14 M/UL — SIGNIFICANT CHANGE UP (ref 3.8–5.2)
RBC # FLD: 28.2 % — HIGH (ref 10.3–16.9)
SODIUM SERPL-SCNC: 138 MMOL/L — SIGNIFICANT CHANGE UP (ref 135–145)
SPECIMEN SOURCE: SIGNIFICANT CHANGE UP
SPECIMEN SOURCE: SIGNIFICANT CHANGE UP
WBC # BLD: 8.1 K/UL — SIGNIFICANT CHANGE UP (ref 3.8–10.5)
WBC # FLD AUTO: 8.1 K/UL — SIGNIFICANT CHANGE UP (ref 3.8–10.5)

## 2018-01-26 PROCEDURE — 82378 CARCINOEMBRYONIC ANTIGEN: CPT

## 2018-01-26 PROCEDURE — 83550 IRON BINDING TEST: CPT

## 2018-01-26 PROCEDURE — 86923 COMPATIBILITY TEST ELECTRIC: CPT

## 2018-01-26 PROCEDURE — 88305 TISSUE EXAM BY PATHOLOGIST: CPT

## 2018-01-26 PROCEDURE — 88307 TISSUE EXAM BY PATHOLOGIST: CPT

## 2018-01-26 PROCEDURE — 99284 EMERGENCY DEPT VISIT MOD MDM: CPT | Mod: 25

## 2018-01-26 PROCEDURE — 87205 SMEAR GRAM STAIN: CPT

## 2018-01-26 PROCEDURE — 86850 RBC ANTIBODY SCREEN: CPT

## 2018-01-26 PROCEDURE — 83010 ASSAY OF HAPTOGLOBIN QUANT: CPT

## 2018-01-26 PROCEDURE — 84443 ASSAY THYROID STIM HORMONE: CPT

## 2018-01-26 PROCEDURE — 86900 BLOOD TYPING SEROLOGIC ABO: CPT

## 2018-01-26 PROCEDURE — 86901 BLOOD TYPING SEROLOGIC RH(D): CPT

## 2018-01-26 PROCEDURE — 85379 FIBRIN DEGRADATION QUANT: CPT

## 2018-01-26 PROCEDURE — 87186 SC STD MICRODIL/AGAR DIL: CPT

## 2018-01-26 PROCEDURE — 85730 THROMBOPLASTIN TIME PARTIAL: CPT

## 2018-01-26 PROCEDURE — 76830 TRANSVAGINAL US NON-OB: CPT

## 2018-01-26 PROCEDURE — 80074 ACUTE HEPATITIS PANEL: CPT

## 2018-01-26 PROCEDURE — 36415 COLL VENOUS BLD VENIPUNCTURE: CPT

## 2018-01-26 PROCEDURE — 87184 SC STD DISK METHOD PER PLATE: CPT

## 2018-01-26 PROCEDURE — 87075 CULTR BACTERIA EXCEPT BLOOD: CPT

## 2018-01-26 PROCEDURE — 87040 BLOOD CULTURE FOR BACTERIA: CPT

## 2018-01-26 PROCEDURE — 81003 URINALYSIS AUTO W/O SCOPE: CPT

## 2018-01-26 PROCEDURE — 99152 MOD SED SAME PHYS/QHP 5/>YRS: CPT

## 2018-01-26 PROCEDURE — 87389 HIV-1 AG W/HIV-1&-2 AB AG IA: CPT

## 2018-01-26 PROCEDURE — C1769: CPT

## 2018-01-26 PROCEDURE — 81001 URINALYSIS AUTO W/SCOPE: CPT

## 2018-01-26 PROCEDURE — 83605 ASSAY OF LACTIC ACID: CPT

## 2018-01-26 PROCEDURE — 87449 NOS EACH ORGANISM AG IA: CPT

## 2018-01-26 PROCEDURE — 82746 ASSAY OF FOLIC ACID SERUM: CPT

## 2018-01-26 PROCEDURE — 71260 CT THORAX DX C+: CPT

## 2018-01-26 PROCEDURE — 82607 VITAMIN B-12: CPT

## 2018-01-26 PROCEDURE — 84702 CHORIONIC GONADOTROPIN TEST: CPT

## 2018-01-26 PROCEDURE — 85610 PROTHROMBIN TIME: CPT

## 2018-01-26 PROCEDURE — 85652 RBC SED RATE AUTOMATED: CPT

## 2018-01-26 PROCEDURE — 82728 ASSAY OF FERRITIN: CPT

## 2018-01-26 PROCEDURE — 85025 COMPLETE CBC W/AUTO DIFF WBC: CPT

## 2018-01-26 PROCEDURE — 87324 CLOSTRIDIUM AG IA: CPT

## 2018-01-26 PROCEDURE — 36430 TRANSFUSION BLD/BLD COMPNT: CPT

## 2018-01-26 PROCEDURE — P9016: CPT

## 2018-01-26 PROCEDURE — 85027 COMPLETE CBC AUTOMATED: CPT

## 2018-01-26 PROCEDURE — 74177 CT ABD & PELVIS W/CONTRAST: CPT

## 2018-01-26 PROCEDURE — 96375 TX/PRO/DX INJ NEW DRUG ADDON: CPT

## 2018-01-26 PROCEDURE — 83020 HEMOGLOBIN ELECTROPHORESIS: CPT

## 2018-01-26 PROCEDURE — 82248 BILIRUBIN DIRECT: CPT

## 2018-01-26 PROCEDURE — 86038 ANTINUCLEAR ANTIBODIES: CPT

## 2018-01-26 PROCEDURE — 93005 ELECTROCARDIOGRAM TRACING: CPT

## 2018-01-26 PROCEDURE — 80048 BASIC METABOLIC PNL TOTAL CA: CPT

## 2018-01-26 PROCEDURE — 80307 DRUG TEST PRSMV CHEM ANLYZR: CPT

## 2018-01-26 PROCEDURE — 97161 PT EVAL LOW COMPLEX 20 MIN: CPT

## 2018-01-26 PROCEDURE — 71045 X-RAY EXAM CHEST 1 VIEW: CPT

## 2018-01-26 PROCEDURE — 83690 ASSAY OF LIPASE: CPT

## 2018-01-26 PROCEDURE — 80053 COMPREHEN METABOLIC PANEL: CPT

## 2018-01-26 PROCEDURE — 85045 AUTOMATED RETICULOCYTE COUNT: CPT

## 2018-01-26 PROCEDURE — 49406 IMAGE CATH FLUID PERI/RETRO: CPT

## 2018-01-26 PROCEDURE — 83735 ASSAY OF MAGNESIUM: CPT

## 2018-01-26 PROCEDURE — 83615 LACTATE (LD) (LDH) ENZYME: CPT

## 2018-01-26 PROCEDURE — 84100 ASSAY OF PHOSPHORUS: CPT

## 2018-01-26 PROCEDURE — 96374 THER/PROPH/DIAG INJ IV PUSH: CPT | Mod: XU

## 2018-01-26 PROCEDURE — 87070 CULTURE OTHR SPECIMN AEROBIC: CPT

## 2018-01-26 PROCEDURE — 97116 GAIT TRAINING THERAPY: CPT

## 2018-01-26 PROCEDURE — 81025 URINE PREGNANCY TEST: CPT

## 2018-01-26 PROCEDURE — 74022 RADEX COMPL AQT ABD SERIES: CPT

## 2018-01-26 PROCEDURE — 86301 IMMUNOASSAY TUMOR CA 19-9: CPT

## 2018-01-26 PROCEDURE — 74019 RADEX ABDOMEN 2 VIEWS: CPT

## 2018-01-26 PROCEDURE — C1729: CPT

## 2018-01-26 RX ORDER — DOCUSATE SODIUM 100 MG
1 CAPSULE ORAL
Qty: 20 | Refills: 0 | OUTPATIENT
Start: 2018-01-26 | End: 2018-02-04

## 2018-01-26 RX ORDER — ENOXAPARIN SODIUM 100 MG/ML
1 INJECTION SUBCUTANEOUS
Qty: 14 | Refills: 0 | OUTPATIENT
Start: 2018-01-26 | End: 2018-02-01

## 2018-01-26 RX ORDER — POTASSIUM PHOSPHATE, MONOBASIC POTASSIUM PHOSPHATE, DIBASIC 236; 224 MG/ML; MG/ML
15 INJECTION, SOLUTION INTRAVENOUS ONCE
Qty: 0 | Refills: 0 | Status: COMPLETED | OUTPATIENT
Start: 2018-01-26 | End: 2018-01-26

## 2018-01-26 RX ADMIN — FAMOTIDINE 20 MILLIGRAM(S): 10 INJECTION INTRAVENOUS at 11:39

## 2018-01-26 RX ADMIN — ENOXAPARIN SODIUM 50 MILLIGRAM(S): 100 INJECTION SUBCUTANEOUS at 18:01

## 2018-01-26 RX ADMIN — ENOXAPARIN SODIUM 50 MILLIGRAM(S): 100 INJECTION SUBCUTANEOUS at 05:07

## 2018-01-26 RX ADMIN — POTASSIUM PHOSPHATE, MONOBASIC POTASSIUM PHOSPHATE, DIBASIC 62.5 MILLIMOLE(S): 236; 224 INJECTION, SOLUTION INTRAVENOUS at 14:07

## 2018-01-26 NOTE — DISCHARGE NOTE ADULT - MEDICATION SUMMARY - MEDICATIONS TO TAKE
I will START or STAY ON the medications listed below when I get home from the hospital:    oxyCODONE-acetaminophen 5 mg-325 mg oral tablet  -- 1 tab(s) by mouth every 6 hours, As Needed -for severe pain MDD:4   -- Caution federal law prohibits the transfer of this drug to any person other  than the person for whom it was prescribed.  May cause drowsiness.  Alcohol may intensify this effect.  Use care when operating dangerous machinery.  This prescription cannot be refilled.  This product contains acetaminophen.  Do not use  with any other product containing acetaminophen to prevent possible liver damage.  Using more of this medication than prescribed may cause serious breathing problems.    -- Indication: For severe pain    enoxaparin 60 mg/0.6 mL injectable solution  -- 1 milligram(s) subcutaneously 2 times a day x 7 days MDD:2   -- It is very important that you take or use this exactly as directed.  Do not skip doses or discontinue unless directed by your doctor.    -- Indication: For blood thinner    Colace 100 mg oral capsule  -- 1 cap(s) by mouth 2 times a day -for constipation MDD:2   -- Medication should be taken with plenty of water.    -- Indication: For COnstipation

## 2018-01-26 NOTE — DISCHARGE NOTE ADULT - PLAN OF CARE
Follow up and recovery Please follow up with Dr. Kaylin Dumont in 1-2 weeks. Please call the number below to schedule an appointment.    General Discharge Instructions:  Please resume all regular home medications unless specifically advised not to take a particular medication. Also, please take any new medications as prescribed.  Please get plenty of rest, continue to ambulate several times per day, and drink adequate amounts of fluids. Avoid lifting weights greater than 5-10 lbs until you follow-up with your surgeon, who will instruct you further regarding activity restrictions.  Avoid driving or operating heavy machinery while taking pain medications.  Please follow-up with your surgeon and Primary Care Provider (PCP) as advised.  Incision Care:  *Please call your doctor or nurse practitioner if you have increased pain, swelling, redness, or drainage from the incision site.  *Avoid swimming and baths until your follow-up appointment.  *You may shower, and wash surgical incisions with a mild soap and warm water. Gently pat the area dry. Anticoagulation for blood clot Please continue using Lovenox injections two times per day. You will continue this medication for a total of three months. Dressing changes Please change dressing as needed. Keep wounds on abdomen clean. The midline wound does not need to be packed anymore. Please use gauze and tape to cover abdominal wounds. Please continue using Lovenox injections two times per day. I have prescribed you for 2 weeks worth Please follow up with Dr. Kaylin Dumont in 1 week. Please call the number below to schedule an appointment.    General Discharge Instructions:  Please resume all regular home medications unless specifically advised not to take a particular medication. Also, please take any new medications as prescribed.  Please get plenty of rest, continue to ambulate several times per day, and drink adequate amounts of fluids. Avoid lifting weights greater than 5-10 lbs until you follow-up with your surgeon, who will instruct you further regarding activity restrictions.  Avoid driving or operating heavy machinery while taking pain medications.  Please follow-up with your surgeon and Primary Care Provider (PCP) as advised.  Incision Care:  *Please call your doctor or nurse practitioner if you have increased pain, swelling, redness, or drainage from the incision site.  *Avoid swimming and baths until your follow-up appointment.  *You may shower, and wash surgical incisions with a mild soap and warm water. Gently pat the area dry. Please continue using Lovenox injections two times per day. I have prescribed you for 1 weeks worth. Please follow up with Dr. Kaylin Dumont in 1 week and remind her that you will need new oral anticoagulation medication after you have insurance.

## 2018-01-26 NOTE — PROGRESS NOTE ADULT - SUBJECTIVE AND OBJECTIVE BOX
Patient doing well.     Tmax 99.7F                          9.5    8.1   )-----------( 653      ( 26 Jan 2018 08:16 )             32.2     SAVANNAH Output:    RUQ: 3cc/24hr (5cc the day before)  LLQ: 10cc/24hr (35cc the day before)  RLQ: 30cc/24hr (90cc the day before)    Culture showing no growth.

## 2018-01-26 NOTE — DISCHARGE NOTE ADULT - CARE PLAN
Principal Discharge DX:	Intussusception  Goal:	Follow up and recovery  Assessment and plan of treatment:	Please follow up with Dr. Kaylin Dumont in 1-2 weeks. Please call the number below to schedule an appointment.    General Discharge Instructions:  Please resume all regular home medications unless specifically advised not to take a particular medication. Also, please take any new medications as prescribed.  Please get plenty of rest, continue to ambulate several times per day, and drink adequate amounts of fluids. Avoid lifting weights greater than 5-10 lbs until you follow-up with your surgeon, who will instruct you further regarding activity restrictions.  Avoid driving or operating heavy machinery while taking pain medications.  Please follow-up with your surgeon and Primary Care Provider (PCP) as advised.  Incision Care:  *Please call your doctor or nurse practitioner if you have increased pain, swelling, redness, or drainage from the incision site.  *Avoid swimming and baths until your follow-up appointment.  *You may shower, and wash surgical incisions with a mild soap and warm water. Gently pat the area dry. Principal Discharge DX:	Intussusception  Goal:	Follow up and recovery  Assessment and plan of treatment:	Please follow up with Dr. Kaylin Dumont in 1-2 weeks. Please call the number below to schedule an appointment.    General Discharge Instructions:  Please resume all regular home medications unless specifically advised not to take a particular medication. Also, please take any new medications as prescribed.  Please get plenty of rest, continue to ambulate several times per day, and drink adequate amounts of fluids. Avoid lifting weights greater than 5-10 lbs until you follow-up with your surgeon, who will instruct you further regarding activity restrictions.  Avoid driving or operating heavy machinery while taking pain medications.  Please follow-up with your surgeon and Primary Care Provider (PCP) as advised.  Incision Care:  *Please call your doctor or nurse practitioner if you have increased pain, swelling, redness, or drainage from the incision site.  *Avoid swimming and baths until your follow-up appointment.  *You may shower, and wash surgical incisions with a mild soap and warm water. Gently pat the area dry.  Goal:	Anticoagulation for blood clot  Assessment and plan of treatment:	Please continue using Lovenox injections two times per day. You will continue this medication for a total of three months.  Goal:	Dressing changes  Assessment and plan of treatment:	Please change dressing as needed. Keep wounds on abdomen clean. The midline wound does not need to be packed anymore. Please use gauze and tape to cover abdominal wounds. Principal Discharge DX:	Intussusception  Goal:	Follow up and recovery  Assessment and plan of treatment:	Please follow up with Dr. Kaylin Dumont in 1-2 weeks. Please call the number below to schedule an appointment.    General Discharge Instructions:  Please resume all regular home medications unless specifically advised not to take a particular medication. Also, please take any new medications as prescribed.  Please get plenty of rest, continue to ambulate several times per day, and drink adequate amounts of fluids. Avoid lifting weights greater than 5-10 lbs until you follow-up with your surgeon, who will instruct you further regarding activity restrictions.  Avoid driving or operating heavy machinery while taking pain medications.  Please follow-up with your surgeon and Primary Care Provider (PCP) as advised.  Incision Care:  *Please call your doctor or nurse practitioner if you have increased pain, swelling, redness, or drainage from the incision site.  *Avoid swimming and baths until your follow-up appointment.  *You may shower, and wash surgical incisions with a mild soap and warm water. Gently pat the area dry.  Goal:	Anticoagulation for blood clot  Assessment and plan of treatment:	Please continue using Lovenox injections two times per day. I have prescribed you for 2 weeks worth  Goal:	Dressing changes  Assessment and plan of treatment:	Please change dressing as needed. Keep wounds on abdomen clean. The midline wound does not need to be packed anymore. Please use gauze and tape to cover abdominal wounds. Principal Discharge DX:	Intussusception  Goal:	Follow up and recovery  Assessment and plan of treatment:	Please follow up with Dr. Kaylin Dumont in 1 week. Please call the number below to schedule an appointment.    General Discharge Instructions:  Please resume all regular home medications unless specifically advised not to take a particular medication. Also, please take any new medications as prescribed.  Please get plenty of rest, continue to ambulate several times per day, and drink adequate amounts of fluids. Avoid lifting weights greater than 5-10 lbs until you follow-up with your surgeon, who will instruct you further regarding activity restrictions.  Avoid driving or operating heavy machinery while taking pain medications.  Please follow-up with your surgeon and Primary Care Provider (PCP) as advised.  Incision Care:  *Please call your doctor or nurse practitioner if you have increased pain, swelling, redness, or drainage from the incision site.  *Avoid swimming and baths until your follow-up appointment.  *You may shower, and wash surgical incisions with a mild soap and warm water. Gently pat the area dry.  Goal:	Anticoagulation for blood clot  Assessment and plan of treatment:	Please continue using Lovenox injections two times per day. I have prescribed you for 1 weeks worth. Please follow up with Dr. Kaylin Dumont in 1 week and remind her that you will need new oral anticoagulation medication after you have insurance.  Goal:	Dressing changes  Assessment and plan of treatment:	Please change dressing as needed. Keep wounds on abdomen clean. The midline wound does not need to be packed anymore. Please use gauze and tape to cover abdominal wounds.

## 2018-01-26 NOTE — PROGRESS NOTE ADULT - ASSESSMENT
28 yo F s/p intraperitoneal drainage x3. Drain flushed with 5cc NS. Some resistance in the RUQ collection, the remaining two without resistance. Goal output < 10cc/24H x 2 consecutive days. If the patient is going home today, RUQ drain can be pulled. Patient should keep track of output at home and follow up with Dr. Dumont or IR to pull drains once they petar 26 yo F s/p intraperitoneal drainage x3. Drains flushed with 5cc NS. Some resistance in the RUQ collection, the remaining two without resistance. Goal output < 10cc/24H x 2 consecutive days. If the patient is going home today, RUQ drain can be pulled. Patient should keep track of output at home and return for rescan (CT abd/pelvis w oral and iv contrast) once output reaches goal to reassess collections prior to removal given size of the original collections.     Discussed w Dr. Rosado.    Deb Lyles PGY-3  # [phone]

## 2018-01-26 NOTE — DISCHARGE NOTE ADULT - CARE PROVIDER_API CALL
Kaylin Dumont), Surgery; Surgical Oncology  [address]  [address]  [place] [address]  Phone: [phone]  Fax: [phone]

## 2018-01-26 NOTE — PROGRESS NOTE ADULT - SUBJECTIVE AND OBJECTIVE BOX
ON : Afebrile overnight, SAVANNAH output minimal: 0/10/0  1/25: MARY, SAVANNAH minimal serous output, cx with no growth, xarelto to be sorted by SW in AM ON : Afebrile overnight, SAVANNAH output minimal: 0/10/0  1/25: MARY, SAVANNAH minimal serous output, cx with no growth, xarelto to be sorted by SW in AM    STATUS POST:  Diagnostic laparoscopy, small bowel resection, ileocecectomy 1/12     SUBJECTIVE: Patient seen and examined bedside by chief resident. No nausea/vomiting, no fever, no abd pain. patient is tolerating well with her diet and ambulating.     enoxaparin Injectable 50 milliGRAM(s) SubCutaneous two times a day      Vital Signs Last 24 Hrs  T(C): 36.9 (26 Jan 2018 05:22), Max: 37.6 (25 Jan 2018 16:40)  T(F): 98.5 (26 Jan 2018 05:22), Max: 99.7 (25 Jan 2018 16:40)  HR: 83 (26 Jan 2018 05:22) (80 - 93)  BP: 107/71 (26 Jan 2018 05:22) (104/68 - 108/70)  BP(mean): --  RR: 17 (26 Jan 2018 05:22) (16 - 17)  SpO2: 99% (26 Jan 2018 05:22) (98% - 100%)  I&O's Detail    25 Jan 2018 07:01  -  26 Jan 2018 07:00  --------------------------------------------------------  IN:    Oral Fluid: 1040 mL  Total IN: 1040 mL    OUT:    Bulb: 3 mL    Bulb: 30 mL    Bulb: 10 mL    Voided: 1100 mL  Total OUT: 1143 mL    Total NET: -103 mL          General: NAD, resting comfortably in bed  C/V: NSR  Pulm: Nonlabored breathing, no respiratory distress  Abd: soft, NT/ND. incisions C/D/I. SAVANNAH drain intact x3, minimal output  Extrem: WWP, no edema, SCDs in place        LABS:                        9.4    6.8   )-----------( 752      ( 25 Jan 2018 06:51 )             32.5     01-25    136  |  95<L>  |  5<L>  ----------------------------<  90  4.4   |  27  |  0.51    Ca    9.4      25 Jan 2018 07:24  Phos  2.5     01-25  Mg     1.9     01-25    TPro  8.0  /  Alb  3.1<L>  /  TBili  0.3  /  DBili  x   /  AST  24  /  ALT  14  /  AlkPhos  63  01-25    PT/INR - ( 24 Jan 2018 08:28 )   PT: 13.0 sec;   INR: 1.17          PTT - ( 24 Jan 2018 08:28 )  PTT:29.3 sec      RADIOLOGY & ADDITIONAL STUDIES:

## 2018-01-26 NOTE — DISCHARGE NOTE ADULT - PATIENT PORTAL LINK FT
“You can access the [employer] Patient Portal, offered by [facility], by registering with the following website: [email or web address]”

## 2018-01-26 NOTE — PROGRESS NOTE ADULT - ASSESSMENT
27y Female with SBO/intussusception now s/p Diagnostic laparoscopy, small bowel resection, ileocecectomy 1/12, currently with possible MVT on Lovenox, c/w fluid collections over pelvic inlet and R paracolic gutter.     Pain/nausea control - IV tylenol and toradol PRN  Fish Diet + Ensure  DVT ppx: SCDS/Lovenox 50mg BID  IV Zosyn (1/17-)  AM labs (total pRBC transfusion : 1Unit)  Iron sucrose injection (1/10-1/15)   Drains: IR drains x3  Dispo plan: Home with iron supplement  surgical culture : klebsiella pneumonie, strep bovis sensitive to cipro and clinda 27y Female with SBO/intussusception now s/p Diagnostic laparoscopy, small bowel resection, ileocecectomy 1/12, currently with possible MVT on Lovenox, c/w fluid collections over pelvic inlet and R paracolic gutter.     For PT to re-evaluate her dispo plan  Pain/nausea control - IV tylenol and toradol PRN  Fish Diet + Ensure  DVT ppx: SCDS/Lovenox 50mg BID  IV Zosyn (1/17-)  follow up AM labs   Iron sucrose injection (1/10-1/15)   Drains: IR drains x3 - possible to remove it, follow up with IR  surgical culture : klebsiella pneumonie, strep bovis sensitive to cipro and clinda

## 2018-01-26 NOTE — PROGRESS NOTE ADULT - PROVIDER SPECIALTY LIST ADULT
Gastroenterology
Gastroenterology
Gyn Onc
Heme/Onc
Heme/Onc
Intervent Radiology
Surgery
Gastroenterology
Surgery

## 2018-01-26 NOTE — DISCHARGE NOTE ADULT - ADDITIONAL INSTRUCTIONS
Warning Signs:  Please call your doctor or nurse practitioner if you experience the following:  *You experience new chest pain, pressure, squeezing or tightness.  *New or worsening cough, shortness of breath, or wheeze.  *If you are vomiting and cannot keep down fluids or your medications.  *You are getting dehydrated due to continued vomiting, diarrhea, or other reasons. Signs of dehydration include dry mouth, rapid heartbeat, or feeling dizzy or faint when standing.  *You see blood or dark/black material when you vomit or have a bowel movement.  *You experience burning when you urinate, have blood in your urine, or experience a discharge.  *Your pain is not improving within 8-12 hours or is not gone within 24 hours. Call or return immediately if your pain is getting worse, changes location, or moves to your chest or back.  *You have shaking chills, or fever greater than 101.5 degrees Fahrenheit or 38 degrees Celsius.  *Any change in your symptoms, or any new symptoms that concern you.

## 2018-01-26 NOTE — DISCHARGE NOTE ADULT - HOSPITAL COURSE
HPI:  This is a 26 yo female with no significant past medical history, surgical history of intussusception on 2008, in which she underwent ex-lap with SBR and appendectomy in outside hospital ([place]) and complicated by small bowel obstruction s/p diagnostic laparoscopic with lysis of adhesion in 2016. Since then, patient was asymptomatic until last year 2017, she developed pain over the epigastric area and went to [facility] where she had colonoscopy and endoscopy done. She was informed to have polyps. Biopsy was taken and was told that it was a benign polyp.     She presented to [facility] yesterday (1/8) for abdominal pain since Friday (1/5) which related to food intake. The pain is acute in onset, located at epigastric area in the beginning and shifted to [facility]. There was no nausea or vomiting and no fever. Patient went to [facility] and CT scan revealed a left lower quadrant enteroenteric intussusception and  2.3 x 1.8 x 2.1 cm polypoid lesion (small bowel tumor) which projects at the distal aspect of the intussusception, and likely represents a lead point. There is also a  3.1 x 2.9 x 3.1 cm  mass in relation to the inferior pole of cecum. No pneumoperitoneum and no sign of obstruction.    Patient last bowel movement was yesterday and last meal was breakfast this morning. No bloody bowel movement, no diarrhea and no constipation. Passing gas regularly.  Previous colonoscopy was 2017 as mentioned.     She was transferred to [facility] for further treatment and evaluation of  intussusception with no sign of SBO. Afebrile, benign abd exam with no leukocytosis. VSS. 1/9: AM hgb 6.8; repeat 7.4; Hem onc consulted- pending lab results, GI rec: retrograde enteroscopy; gyn consult. Passing gas/ +BM, c/o bleeding (?menses and BRBPR) with clots. Hepatitis panel neg, HIV neg, reticulocyte WNL, Iron study WNL.  Serial CBC @12am:  Hb stable at 7.8.  1/10: Colonoscopy revealed innumerable ileal nodules with greatest concentration in the distal ileum, biopsied. Inverted appendix. No cecal mass. Normal colonic mucosae in other areas. Recommended MRE to assess for proximal intestinal lesion and may consider anterograde antroscopy depending on MRE results. CEA 4.7 (mild increase), increase ESR (24)  1/11: CA-19.9 negative; inc LLQ in PM; restarted on diet; NPO at midnight That evening, acute lower abdominal pain + guarding/ + peritoneal; vitals normal; lactate 1.4;  emesis x2. Unable to tolerate gastrograffin. NGT inserted. CT scan done. Upright abd x-ray revealed air fluid level in the small bowel suggestive of SBO.  1/12 Went to OR for Dx Laparoscopy, SBR, ileocecectomy, sedated with ketamine intraoperatively which resulted in some hallucinations in PACU, responsive but sedated during POC, no acute events  1/15 Began advancing diet which was tolerated   1/16 Advanced to Regular diet. Rpt CT showed no collections. Started on Lovenox for thrombus on IMV  1/18 SDU Genetic testing for Peut-Jeghers +BM OOB ambulating  1/19: lower portion of wound opened, some purulent discharge , breast sx and gyn onc consult  1/20 day/ovn: Bcx no growth after 4 days. Surgical swab Cx growing K pneumo, S bovis, Prevotella. All sensitive to Zosyn (and Unasyn). GYN Onc recommending out pt follow up with their clinic. They will schedule her an appointment with Dr. Obregon in 1-2 months.  1/21: Distension. F/u AXR-- mildly dilated loops  1/22: WBC 15.7 [12.9]; T: 100.4 @ 6AM; CT c/a/p ordered; UA + leuk esterase; urine cx sent; lactate 2.0; CT results: 2 collections [id number] bilobed in pelvic inlet + 4.4 x 3.8 cm collection in R paracolic gutter  1/24: IR for drainage; drain x3; restarted on diet (pescotarian);  serous drainage from IR drains.

## 2018-01-27 LAB
CULTURE RESULTS: NO GROWTH — SIGNIFICANT CHANGE UP
SPECIMEN SOURCE: SIGNIFICANT CHANGE UP

## 2018-01-30 DIAGNOSIS — Q85.8 OTHER PHAKOMATOSES, NOT ELSEWHERE CLASSIFIED: ICD-10-CM

## 2018-01-30 DIAGNOSIS — E28.2 POLYCYSTIC OVARIAN SYNDROME: ICD-10-CM

## 2018-01-30 DIAGNOSIS — R10.31 RIGHT LOWER QUADRANT PAIN: ICD-10-CM

## 2018-01-30 DIAGNOSIS — K56.1 INTUSSUSCEPTION: ICD-10-CM

## 2018-01-30 DIAGNOSIS — D47.3 ESSENTIAL (HEMORRHAGIC) THROMBOCYTHEMIA: ICD-10-CM

## 2018-01-30 DIAGNOSIS — R19.04 LEFT LOWER QUADRANT ABDOMINAL SWELLING, MASS AND LUMP: ICD-10-CM

## 2018-01-30 DIAGNOSIS — Z71.3 DIETARY COUNSELING AND SURVEILLANCE: ICD-10-CM

## 2018-01-30 DIAGNOSIS — D50.0 IRON DEFICIENCY ANEMIA SECONDARY TO BLOOD LOSS (CHRONIC): ICD-10-CM

## 2018-01-30 DIAGNOSIS — Z28.21 IMMUNIZATION NOT CARRIED OUT BECAUSE OF PATIENT REFUSAL: ICD-10-CM

## 2018-01-30 DIAGNOSIS — D69.6 THROMBOCYTOPENIA, UNSPECIFIED: ICD-10-CM

## 2018-02-22 ENCOUNTER — TRANSCRIPTION ENCOUNTER (OUTPATIENT)
Age: 28
End: 2018-02-22

## 2018-02-22 ENCOUNTER — APPOINTMENT (OUTPATIENT)
Dept: GYNECOLOGIC ONCOLOGY | Facility: CLINIC | Age: 28
End: 2018-02-22
Payer: COMMERCIAL

## 2018-02-22 VITALS
DIASTOLIC BLOOD PRESSURE: 63 MMHG | HEART RATE: 62 BPM | OXYGEN SATURATION: 98 % | SYSTOLIC BLOOD PRESSURE: 90 MMHG | WEIGHT: 101 LBS

## 2018-02-22 DIAGNOSIS — F15.90 OTHER STIMULANT USE, UNSPECIFIED, UNCOMPLICATED: ICD-10-CM

## 2018-02-22 DIAGNOSIS — Z83.0 FAMILY HISTORY OF HUMAN IMMUNODEFICIENCY VIRUS [HIV] DISEASE: ICD-10-CM

## 2018-02-22 DIAGNOSIS — Z78.9 OTHER SPECIFIED HEALTH STATUS: ICD-10-CM

## 2018-02-22 DIAGNOSIS — Z83.3 FAMILY HISTORY OF DIABETES MELLITUS: ICD-10-CM

## 2018-02-22 DIAGNOSIS — R18.8 OTHER ASCITES: ICD-10-CM

## 2018-02-22 DIAGNOSIS — Z82.49 FAMILY HISTORY OF ISCHEMIC HEART DISEASE AND OTHER DISEASES OF THE CIRCULATORY SYSTEM: ICD-10-CM

## 2018-02-22 PROCEDURE — 99215 OFFICE O/P EST HI 40 MIN: CPT

## 2018-02-23 PROBLEM — Z78.9 SOCIAL ALCOHOL USE: Status: ACTIVE | Noted: 2018-02-23

## 2018-02-23 PROBLEM — Z82.49 FAMILY HISTORY OF ESSENTIAL HYPERTENSION: Status: ACTIVE | Noted: 2018-02-23

## 2018-02-23 PROBLEM — Z83.3 FAMILY HISTORY OF DIABETES MELLITUS: Status: ACTIVE | Noted: 2018-02-23

## 2018-02-23 PROBLEM — Z83.0 FAMILY HISTORY OF AIDS: Status: ACTIVE | Noted: 2018-02-23

## 2018-02-23 PROBLEM — F15.90 CAFFEINE USE: Status: ACTIVE | Noted: 2018-02-23

## 2018-02-23 RX ORDER — MISOPROSTOL 200 UG/1
200 TABLET ORAL AT BEDTIME
Qty: 4 | Refills: 0 | Status: ACTIVE | COMMUNITY
Start: 2018-02-23 | End: 1900-01-01

## 2018-02-26 LAB — HPV HIGH+LOW RISK DNA PNL CVX: NOT DETECTED

## 2018-02-27 LAB
MISCELLANEOUS TEST NAME: SIGNIFICANT CHANGE UP
PAP TEST: NORMAL

## 2018-03-01 PROBLEM — R18.8 ABDOMINAL FLUID COLLECTION: Status: ACTIVE | Noted: 2017-05-12

## 2018-03-08 ENCOUNTER — APPOINTMENT (OUTPATIENT)
Dept: GYNECOLOGIC ONCOLOGY | Facility: CLINIC | Age: 28
End: 2018-03-08
Payer: COMMERCIAL

## 2018-03-21 ENCOUNTER — APPOINTMENT (OUTPATIENT)
Dept: GYNECOLOGIC ONCOLOGY | Facility: CLINIC | Age: 28
End: 2018-03-21
Payer: COMMERCIAL

## 2018-03-26 ENCOUNTER — APPOINTMENT (OUTPATIENT)
Dept: GYNECOLOGIC ONCOLOGY | Facility: CLINIC | Age: 28
End: 2018-03-26
Payer: COMMERCIAL

## 2018-03-26 DIAGNOSIS — R93.8 ABNORMAL FINDINGS ON DIAGNOSTIC IMAGING OF OTHER SPECIFIED BODY STRUCTURES: ICD-10-CM

## 2018-03-26 PROCEDURE — 58100 BIOPSY OF UTERUS LINING: CPT

## 2018-03-28 LAB — LH SURGICAL PATHOLOGY FINAL REPORT: NORMAL

## 2018-04-03 PROBLEM — R93.8 THICKENED ENDOMETRIUM: Status: ACTIVE | Noted: 2018-02-22

## 2022-02-15 NOTE — H&P ADULT - NSHPROSALLOTHERNEGRD_GEN_ALL_CORE
All other review of systems negative, except as noted in HPI Pt  transferred from [facility]  for r/o  appy. Pt denies any pain at this time. pt vomitted today at the other hospital . Pt has 22 right hand flushed well. Zofran  4mg iv 0654 given and tylenol 320 mg po at 0622 and a bolus 250 ml NS. Pt alert and oriented x 3. Pt color pink.

## 2023-01-20 NOTE — H&P ADULT - ATTENDING COMMENTS
No
Patient seen and examined at bedside.  No significant past family history of GI cancers.  +flatus/BM  Abdomen soft, minimal lower abdominal tenderness, ND.  Labs, CT reviewed  As intussusception is not causing a bowel obstruction and clinically patient is improving, will hold off on operative management currently.  GI consult for possible DBE to evaluate cecal mass and possible tumor of small bowel as "lead point" for intussusception.

## 2024-03-11 NOTE — PROGRESS NOTE ADULT - ASSESSMENT
Writer attempted to contact pt to discuss results and recommendations, but was unable to leave voicemail.   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    #Microcytic Anemia  Patient w/ microcytosis upon admission, initial Hg of 8.3 could represent concentrated sample as pt received 2L NS bolus as well as LR. If hx of sickle cell trait and additional anemia her baseline Hg may be lower. In addition, patient currently on menses w/ blood loss. Patient denies any other gross signs of bleeding currently. Clinically non jaundiced appearing. On smear, increased anisocytosis and poikilocytosis including tear drop and elliptocytes, cells also hypochromic w/ increased central pallor consistent with anemia. Hemoglobinopathy eval negative for sickle cell. SUDHA negative. Hemolysis labs negative. Iron panel c/w severe iron deficiency s/p 3 of 5 doses of IV iron w/ low corrected reticulocytes. B12/folate levels normal.     -Trend CBC  -complete 2 more doses of IV iron through 1/14 Sun   -switch to oral Fe as tolerated    #Elevated PTT -unclear etiology, patient with hep exposure ppx dose and acute abd pathology, normal INR at this time, if patient clinically worsens obtain DIC labs.     -repeat PTT  -if remains elevated and clinically toxic obtain DIC studies including fibrinogen/d-dimer/coags  -may need to consider Lupus AC testing or  mixing study     #Small bowel Tumor/intussusception - complicated by SBO, innumerable nodules seen in small bowel, 2.5cm intraluminal mass lead point, mass lesion in the cecum. S/p polypectomy reviewed with pathologist no signs of neoplasm. CEA borderline may consider repeating,  negative. No lymphadenopathy clinically on exam on on CT, LDH wnl.     -plan for OR, possible resection  -will follow up tissue dx  -consider carcinoid testing, urine 5-HIAA, chromogranin A  -ca 125 testing    #Thrombocytopenia  - resolved  Unclear baseline plt count however clinically stable, no ecchymosis or bleeding. Plts of 62 upon presentation unlikely medication related given timing now 200+. 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    #Microcytic Anemia  Patient w/ microcytosis upon admission, initial Hg of 8.3 could represent concentrated sample as pt received 2L NS bolus as well as LR. If hx of sickle cell trait and additional anemia her baseline Hg may be lower. In addition, patient currently on menses w/ blood loss. Patient denies any other gross signs of bleeding currently. Clinically non jaundiced appearing. On smear, increased anisocytosis and poikilocytosis including tear drop and elliptocytes, cells also hypochromic w/ increased central pallor consistent with anemia. Hemoglobinopathy eval negative for sickle cell. SUDHA negative. Hemolysis labs negative. Iron panel c/w severe iron deficiency s/p 3 of 5 doses of IV iron w/ low corrected reticulocytes. B12/folate levels normal.     -Trend CBC  -complete 2 more doses of IV iron through 1/14 Sun   -switch to oral Fe as tolerated    #Elevated PTT -unclear etiology, patient with hep exposure ppx dose and acute abd pathology, normal INR at this time, if patient clinically worsens obtain DIC labs.     -repeat PTT  -if remains elevated and clinically toxic obtain DIC studies including fibrinogen/d-dimer/coags  -unlikely Lupus AC as pt presented w/ normal ptt     #Small bowel Tumor/intussusception - complicated by SBO, innumerable nodules seen in small bowel, 2.5cm intraluminal mass lead point, mass lesion in the cecum. S/p polypectomy reviewed with pathologist no signs of neoplasm. CEA borderline may consider repeating,  negative. No lymphadenopathy clinically on exam on on CT, LDH wnl.     -plan for OR, possible resection  -will follow up tissue dx  -consider carcinoid testing, urine 5-HIAA, chromogranin A  -ca 125 testing    #Thrombocytopenia  - resolved  Unclear baseline plt count however clinically stable, no ecchymosis or bleeding. Plts of 62 upon presentation unlikely medication related given timing now 200+. Negative HIV, hepatitis panel      To be discussed w/ attending